# Patient Record
Sex: FEMALE | Race: BLACK OR AFRICAN AMERICAN | NOT HISPANIC OR LATINO | Employment: OTHER | URBAN - METROPOLITAN AREA
[De-identification: names, ages, dates, MRNs, and addresses within clinical notes are randomized per-mention and may not be internally consistent; named-entity substitution may affect disease eponyms.]

---

## 2018-03-11 ENCOUNTER — OFFICE VISIT (OUTPATIENT)
Dept: URGENT CARE | Facility: CLINIC | Age: 68
End: 2018-03-11
Payer: MEDICARE

## 2018-03-11 VITALS
RESPIRATION RATE: 19 BRPM | BODY MASS INDEX: 32.65 KG/M2 | HEART RATE: 71 BPM | SYSTOLIC BLOOD PRESSURE: 158 MMHG | HEIGHT: 65 IN | WEIGHT: 196 LBS | DIASTOLIC BLOOD PRESSURE: 82 MMHG | TEMPERATURE: 98 F | OXYGEN SATURATION: 97 %

## 2018-03-11 DIAGNOSIS — R05.9 COUGH: Primary | ICD-10-CM

## 2018-03-11 LAB
CTP QC/QA: YES
FLUAV AG NPH QL: NEGATIVE
FLUBV AG NPH QL: NEGATIVE

## 2018-03-11 PROCEDURE — 87804 INFLUENZA ASSAY W/OPTIC: CPT | Mod: 59,QW,S$GLB, | Performed by: PHYSICIAN ASSISTANT

## 2018-03-11 PROCEDURE — 99203 OFFICE O/P NEW LOW 30 MIN: CPT | Mod: S$GLB,,, | Performed by: PHYSICIAN ASSISTANT

## 2018-03-11 RX ORDER — DOXYCYCLINE 100 MG/1
100 CAPSULE ORAL 2 TIMES DAILY
Qty: 20 CAPSULE | Refills: 0 | Status: SHIPPED | OUTPATIENT
Start: 2018-03-11 | End: 2018-03-21

## 2018-03-11 RX ORDER — BENZONATATE 100 MG/1
200 CAPSULE ORAL EVERY 6 HOURS PRN
Qty: 30 CAPSULE | Refills: 1 | Status: SHIPPED | OUTPATIENT
Start: 2018-03-11 | End: 2022-03-10 | Stop reason: ALTCHOICE

## 2018-03-11 RX ORDER — SIMVASTATIN 10 MG/1
10 TABLET, FILM COATED ORAL NIGHTLY
COMMUNITY
End: 2022-03-23

## 2018-03-11 NOTE — PROGRESS NOTES
"Subjective:       Patient ID: Lucrecia Foster is a 68 y.o. female.    Vitals:  height is 5' 5" (1.651 m) and weight is 88.9 kg (196 lb). Her oral temperature is 97.5 °F (36.4 °C). Her blood pressure is 158/82 (abnormal) and her pulse is 71. Her respiration is 19 and oxygen saturation is 97%.     Chief Complaint: Cough    Patient with cough, body aches, chills, and fever x 3 days. No exposure to flu. Patient did get her flu shot.     The patient is a 68 y.o. female with a history of a cough of 3 days duration. The problem is described as severe. The cough is dry. It persists with sleep. Associated signs and symptoms include: runny nose, chills and malaise.    The pt has a history of asthma.  The pt has a history of AR  The pt has a history of eczema.  The pt does not have a  history of urticaria.     The patient has been treated with the following: no treatment to date . The following antibiotics have been presscribed no recent courses.     PMH:  - DM  - HTN  - RASTA  - Kidney Disease: stage 3  - Fibromyalgia  - Vit D def  - Stroke: 2015  - Sciatica     Social  - Retired : 43 years at [employer]  - Denies smoking    Per Dr. Blair I have seen and evaluated this patient. She does have very pronounced deep cough but lungs are clear. Flu neg and CXR without lobar infiltrate  She did have subjective fever and has stable VS Agree with PA disposition and assessment      Cough   This is a new problem. Episode onset: 3 days. The problem has been unchanged. The problem occurs every few minutes. Cough characteristics: sometimes productive at night. Associated symptoms include chills, a fever, myalgias, a sore throat and shortness of breath. Pertinent negatives include no chest pain, ear pain, eye redness, hemoptysis or wheezing. She has tried nothing for the symptoms. There is no history of pneumonia.     Review of Systems   Constitution: Positive for chills, fever and malaise/fatigue.   HENT: Positive for " congestion, hoarse voice and sore throat. Negative for ear pain.    Eyes: Negative for discharge and redness.   Cardiovascular: Negative for chest pain, dyspnea on exertion and leg swelling.   Respiratory: Positive for cough (Right sided chest pain with dry cough), shortness of breath, sleep disturbances due to breathing, snoring (RASTA, uses CPAP) and sputum production. Negative for hemoptysis and wheezing.    Endocrine:        DM: borderline  HTN  Vit D Def   Musculoskeletal: Positive for back pain (hx of sciatica) and myalgias.   Gastrointestinal: Negative for abdominal pain and nausea.   Neurological: Positive for dizziness (followed by neuro).       Objective:      Physical Exam   Constitutional: She is oriented to person, place, and time. She appears well-developed and well-nourished. She is cooperative.  Non-toxic appearance. She does not appear ill. No distress.   HENT:   Head: Normocephalic and atraumatic.   Right Ear: Hearing, tympanic membrane, external ear and ear canal normal. Tympanic membrane is not erythematous. No middle ear effusion.   Left Ear: Hearing, tympanic membrane, external ear and ear canal normal. Tympanic membrane is not erythematous.  No middle ear effusion.   Nose: Mucosal edema present. No rhinorrhea or nasal deformity. No epistaxis. Right sinus exhibits no maxillary sinus tenderness and no frontal sinus tenderness. Left sinus exhibits no maxillary sinus tenderness and no frontal sinus tenderness.   Mouth/Throat: Uvula is midline, oropharynx is clear and moist and mucous membranes are normal. No trismus in the jaw. Normal dentition. No uvula swelling. No oropharyngeal exudate, posterior oropharyngeal edema or posterior oropharyngeal erythema.   Eyes: Conjunctivae and lids are normal. No scleral icterus.   Sclera clear bilat   Neck: Trachea normal, full passive range of motion without pain and phonation normal. Neck supple.   Cardiovascular: Normal rate, regular rhythm, normal heart  sounds, intact distal pulses and normal pulses.    Pulmonary/Chest: Effort normal and breath sounds normal. No accessory muscle usage. No respiratory distress. She exhibits no tenderness and no retraction.   Abdominal: Soft. Normal appearance and bowel sounds are normal. She exhibits no distension. There is no tenderness.   Musculoskeletal: Normal range of motion. She exhibits no edema or deformity.   Neurological: She is alert and oriented to person, place, and time. She exhibits normal muscle tone. Coordination normal.   Skin: Skin is warm, dry and intact. She is not diaphoretic. No pallor.   Psychiatric: She has a normal mood and affect. Her speech is normal and behavior is normal. Judgment and thought content normal. Cognition and memory are normal.   Nursing note and vitals reviewed.        Results for orders placed or performed in visit on 03/11/18   POCT Influenza A/B   Result Value Ref Range    Rapid Influenza A Ag Negative Negative    Rapid Influenza B Ag Negative Negative     Acceptable Yes      Chest Xray Radiology report:  - FINDINGS: The mediastinal structures are midline.  The cardiac silhouette not enlarged.  There is no evidence of acute pulmonary disease, pleural disease, lymph node enlargement, or cardiac decompensation. There is degenerative change of the spine.    Assessment:       1. Cough        Plan:         Cough  -     POCT Influenza A/B  -     X-Ray Chest PA And Lateral; Future; Expected date: 03/11/2018  -     benzonatate (TESSALON PERLES) 100 MG capsule; Take 2 capsules (200 mg total) by mouth every 6 (six) hours as needed for Cough.  Dispense: 30 capsule; Refill: 1  -     doxycycline (VIBRAMYCIN) 100 MG Cap; Take 1 capsule (100 mg total) by mouth 2 (two) times daily.  Dispense: 20 capsule; Refill: 0      Patient Instructions       Cough, Chronic, Uncertain Cause (Adult)    Everyone has had a cough as part of the common cold, flu, or bronchitis. This kind of cough occurs  along with an achy feeling, low-grade fever, nasal and sinus congestion, and a scratchy or sore throat. This usually gets better in 2 to 3 weeks. A cough that lasts longer than 3 weeks may be due to other causes.  If your cough does not improve over the next 2 weeks, further testing may be needed. Follow up with your healthcare provider as advised. Cough suppressants may be recommended. Based on your exam today, the exact cause of your cough is not certain. Below are some common causes for persistent cough.  Smokers cough  Smokers cough doesnt go away. If you continue to smoke, it only gets worse. The cough is from irritation in the air passages. Talk to your healthcare provider about quitting. Medicines or nicotine-replacement products, like gum or the patch, may make quitting easier.  Postnasal drip  A cough that is worse at night may be due to postnasal drip. Excess mucus in the nose drains from the back of your nose to your throat. This triggers the cough reflex. Postnasal drip may be due to a sinus infection or allergy. Common allergens include dust, tobacco smoke (both inhaled and secondhand smoke), environmental pollutants, pollen, mold, pets, cleaning agents, room deodorizers, and chemical fumes. Over-the-counter antihistamines or decongestants may be helpful for allergies. A sinus infection may requires antibiotic treatment. See your healthcare provider if symptoms continue.  Medicines  Certain prescribed medicines can cause a chronic cough in some people:  · ACE inhibitors for high blood pressure. These include benazepril, captopril, enalapril, fosinopril, lisinopril, quinapril, ramipril, and others.  · Beta-blockers for high blood pressure and other conditions. These include propranolol, atenolol, metoprolol, nadolol, and others.  Let your healthcare provider know if you are taking any of these.  Asthma  Cough may be the only sign of mild asthma. You may have tests to find out if asthma is causing your  cough. You may also take asthma medicine on a trial basis.  Acid reflux (heartburn, GERD)  The esophagus is the tube that carries food from the mouth to the stomach. A valve at its lower end prevents stomach acids from flowing upward. If this valve does not work properly, acid from the stomach enters the esophagus. This may cause a burning pain in the upper abdomen or lower chest, belching, or cough. Symptoms are often worse when lying flat. Avoid eating or drinking before bedtime. Try using extra pillows to raise your upper body, or place 4-inch blocks under the head of your bed. You may try an over-the-counter antacid or an acid-blocking medicine such as famotidine, cimetidine, ranitidine, esomeprazole, lansoprazole, or omeprazole. Stronger medicines for this condition can be prescribed by your healthcare provider.  Follow-up care  Follow up with your healthcare provider, or as advised, if your cough does not improve. Further testing may be needed.  Note: If an X-ray was taken, a specialist will review it. You will be notified of any new findings that may affect your care.  When to seek medical advice  Call your healthcare provider right away if any of these occur:  · Mild wheezing or difficulty breathing  · Fever of 100.4ºF (38ºC) or higher, or as directed by your healthcare provider  · Unexpected weight loss  · Coughing up large amounts of colored sputum  · Night sweats (sheets and pajamas get soaking wet)  Call 911, or get immediate medical care  Contact emergency services right away if any of these occur:  · Coughing up blood  · Moderate to severe trouble breathing or wheezing  Date Last Reviewed: 9/13/2015 © 2000-2017 [employer]. [address] [place] [address]. All rights reserved. This information is not intended as a substitute for professional medical care. Always follow your healthcare professional's instructions.      Please return here or go to the Emergency Department for any  concerns or worsening of condition.  If you were prescribed antibiotics, please take them to completion.  If you were prescribed a narcotic medication, do not drive or operate heavy equipment or machinery while taking these medications.  Please follow up with your primary care doctor or specialist as needed.    If you  smoke, please stop smoking.    Results for orders placed or performed in visit on 03/11/18   POCT Influenza A/B   Result Value Ref Range    Rapid Influenza A Ag Negative Negative    Rapid Influenza B Ag Negative Negative     Acceptable Yes

## 2018-03-11 NOTE — PATIENT INSTRUCTIONS
Cough, Chronic, Uncertain Cause (Adult)    Everyone has had a cough as part of the common cold, flu, or bronchitis. This kind of cough occurs along with an achy feeling, low-grade fever, nasal and sinus congestion, and a scratchy or sore throat. This usually gets better in 2 to 3 weeks. A cough that lasts longer than 3 weeks may be due to other causes.  If your cough does not improve over the next 2 weeks, further testing may be needed. Follow up with your healthcare provider as advised. Cough suppressants may be recommended. Based on your exam today, the exact cause of your cough is not certain. Below are some common causes for persistent cough.  Smokers cough  Smokers cough doesnt go away. If you continue to smoke, it only gets worse. The cough is from irritation in the air passages. Talk to your healthcare provider about quitting. Medicines or nicotine-replacement products, like gum or the patch, may make quitting easier.  Postnasal drip  A cough that is worse at night may be due to postnasal drip. Excess mucus in the nose drains from the back of your nose to your throat. This triggers the cough reflex. Postnasal drip may be due to a sinus infection or allergy. Common allergens include dust, tobacco smoke (both inhaled and secondhand smoke), environmental pollutants, pollen, mold, pets, cleaning agents, room deodorizers, and chemical fumes. Over-the-counter antihistamines or decongestants may be helpful for allergies. A sinus infection may requires antibiotic treatment. See your healthcare provider if symptoms continue.  Medicines  Certain prescribed medicines can cause a chronic cough in some people:  · ACE inhibitors for high blood pressure. These include benazepril, captopril, enalapril, fosinopril, lisinopril, quinapril, ramipril, and others.  · Beta-blockers for high blood pressure and other conditions. These include propranolol, atenolol, metoprolol, nadolol, and others.  Let your healthcare provider  know if you are taking any of these.  Asthma  Cough may be the only sign of mild asthma. You may have tests to find out if asthma is causing your cough. You may also take asthma medicine on a trial basis.  Acid reflux (heartburn, GERD)  The esophagus is the tube that carries food from the mouth to the stomach. A valve at its lower end prevents stomach acids from flowing upward. If this valve does not work properly, acid from the stomach enters the esophagus. This may cause a burning pain in the upper abdomen or lower chest, belching, or cough. Symptoms are often worse when lying flat. Avoid eating or drinking before bedtime. Try using extra pillows to raise your upper body, or place 4-inch blocks under the head of your bed. You may try an over-the-counter antacid or an acid-blocking medicine such as famotidine, cimetidine, ranitidine, esomeprazole, lansoprazole, or omeprazole. Stronger medicines for this condition can be prescribed by your healthcare provider.  Follow-up care  Follow up with your healthcare provider, or as advised, if your cough does not improve. Further testing may be needed.  Note: If an X-ray was taken, a specialist will review it. You will be notified of any new findings that may affect your care.  When to seek medical advice  Call your healthcare provider right away if any of these occur:  · Mild wheezing or difficulty breathing  · Fever of 100.4ºF (38ºC) or higher, or as directed by your healthcare provider  · Unexpected weight loss  · Coughing up large amounts of colored sputum  · Night sweats (sheets and pajamas get soaking wet)  Call 911, or get immediate medical care  Contact emergency services right away if any of these occur:  · Coughing up blood  · Moderate to severe trouble breathing or wheezing  Date Last Reviewed: 9/13/2015  © 9085-7359 [employer]. [address] [place] [address], [place] [address]. All rights reserved. This information is not intended as a substitute for  professional medical care. Always follow your healthcare professional's instructions.      Please return here or go to the Emergency Department for any concerns or worsening of condition.  If you were prescribed antibiotics, please take them to completion.  If you were prescribed a narcotic medication, do not drive or operate heavy equipment or machinery while taking these medications.  Please follow up with your primary care doctor or specialist as needed.    If you  smoke, please stop smoking.    Results for orders placed or performed in visit on 03/11/18   POCT Influenza A/B   Result Value Ref Range    Rapid Influenza A Ag Negative Negative    Rapid Influenza B Ag Negative Negative     Acceptable Yes

## 2018-12-14 ENCOUNTER — OFFICE VISIT (OUTPATIENT)
Dept: INTERNAL MEDICINE | Facility: CLINIC | Age: 68
End: 2018-12-14
Payer: MEDICARE

## 2018-12-14 VITALS
OXYGEN SATURATION: 97 % | TEMPERATURE: 98 F | HEART RATE: 72 BPM | BODY MASS INDEX: 33.65 KG/M2 | SYSTOLIC BLOOD PRESSURE: 130 MMHG | HEIGHT: 65 IN | WEIGHT: 201.94 LBS | DIASTOLIC BLOOD PRESSURE: 78 MMHG

## 2018-12-14 DIAGNOSIS — R21 RASH AND NONSPECIFIC SKIN ERUPTION: ICD-10-CM

## 2018-12-14 DIAGNOSIS — L71.0 PERIORAL DERMATITIS: Primary | ICD-10-CM

## 2018-12-14 PROCEDURE — 99203 OFFICE O/P NEW LOW 30 MIN: CPT | Mod: S$GLB,,, | Performed by: NURSE PRACTITIONER

## 2018-12-14 PROCEDURE — 1101F PT FALLS ASSESS-DOCD LE1/YR: CPT | Mod: CPTII,S$GLB,, | Performed by: NURSE PRACTITIONER

## 2018-12-14 PROCEDURE — 99999 PR PBB SHADOW E&M-EST. PATIENT-LVL IV: CPT | Mod: PBBFAC,,, | Performed by: NURSE PRACTITIONER

## 2018-12-14 RX ORDER — KETOCONAZOLE 20 MG/G
CREAM TOPICAL 2 TIMES DAILY
Qty: 60 G | Refills: 0 | Status: SHIPPED | OUTPATIENT
Start: 2018-12-14 | End: 2022-03-10 | Stop reason: ALTCHOICE

## 2018-12-14 RX ORDER — DOXYCYCLINE HYCLATE 100 MG
100 TABLET ORAL EVERY 12 HOURS
Qty: 20 TABLET | Refills: 0 | Status: SHIPPED | OUTPATIENT
Start: 2018-12-14 | End: 2018-12-24

## 2018-12-14 NOTE — PROGRESS NOTES
"Subjective:       Patient ID: Lucrecia Foster is a 68 y.o. female.    Chief Complaint: Rash (for about 3 weeks around th bottom of the mouth dry, itchy, )    HPI:  67 yo female that presents to clinic with complaints of rash to mouth.    States that rash has been there for about 3 weeks.  Denies any previous history with rash.  States that it is very "dry, itchy, scaly and will occasionally burn."  States that she saw a dermatologist outside of [facility] that "perscribed some cream that cost $1100."  States that she has tried putting over the counter steroid cream on it but states no relief.  States that she uses unscented dial soap to wash face.  States that rash is confined to lower part of mouth/chin.  Denies any bleeding or drainage form rash.    Denies any fever, SOB, chest pain, n/v or dizziness.  States that she has been on steroid therapy for eyes and ears recently following surgeries.  States that she is still on steroid drops for her eyes.    Review of Systems   Constitutional: Negative for activity change, appetite change, fatigue and fever.   Respiratory: Negative for apnea, cough, shortness of breath and wheezing.    Cardiovascular: Negative for chest pain, palpitations and leg swelling.   Gastrointestinal: Negative for abdominal distention, abdominal pain, constipation, diarrhea, nausea and vomiting.   Musculoskeletal: Negative for arthralgias, back pain, myalgias, neck pain and neck stiffness.   Skin: Positive for color change and rash.   Neurological: Negative for dizziness, light-headedness, numbness and headaches.   Psychiatric/Behavioral: Negative for behavioral problems.       Objective:      Physical Exam   Constitutional: She is oriented to person, place, and time. She appears well-developed and well-nourished. No distress.   Neck: Normal range of motion. Neck supple. No thyromegaly present.   Cardiovascular: Normal rate, regular rhythm, normal heart sounds and intact distal pulses.   No murmur " heard.  Pulmonary/Chest: Effort normal and breath sounds normal. No stridor. No respiratory distress. She has no wheezes. She has no rales.   Lymphadenopathy:     She has no cervical adenopathy.   Neurological: She is alert and oriented to person, place, and time. No cranial nerve deficit or sensory deficit.   Skin: Skin is warm, dry and intact. Rash noted. Rash is macular.        Macular, pruritic rash with scaling noted to lower mouth and chin.  Skin appears very dry.   Psychiatric: Her behavior is normal.       Assessment:       1. Perioral dermatitis    2. Rash and nonspecific skin eruption        Plan:     -Vitals are stable in clinic.  -Perioral dermatitis vs possible fungal rash.  -Will treat with oral course of doxycycline 100mg po bid x 10 days and topical nizoral cream bid x 10 days.  -Encouraged to avoid any topical steroids to area.  -Can continue to wash face with unscented dial soap.  -Will refer to dermatology for further evaluation and management.  -Encouraged to increase water intake to maintain skin hydration.  -Can also try over the counter unscented moisturizers like Aveeno, Lubriderm or Cetaphil.

## 2019-03-25 ENCOUNTER — OFFICE VISIT (OUTPATIENT)
Dept: URGENT CARE | Facility: CLINIC | Age: 69
End: 2019-03-25
Payer: MEDICARE

## 2019-03-25 ENCOUNTER — HOSPITAL ENCOUNTER (OUTPATIENT)
Dept: RADIOLOGY | Facility: OTHER | Age: 69
Discharge: HOME | End: 2019-03-25
Attending: FAMILY MEDICINE
Payer: MEDICARE

## 2019-03-25 VITALS
DIASTOLIC BLOOD PRESSURE: 85 MMHG | SYSTOLIC BLOOD PRESSURE: 137 MMHG | RESPIRATION RATE: 18 BRPM | BODY MASS INDEX: 33.32 KG/M2 | HEIGHT: 65 IN | OXYGEN SATURATION: 97 % | TEMPERATURE: 99 F | WEIGHT: 200 LBS | HEART RATE: 77 BPM

## 2019-03-25 DIAGNOSIS — R05.9 COUGH: Primary | ICD-10-CM

## 2019-03-25 DIAGNOSIS — R05.9 COUGH: ICD-10-CM

## 2019-03-25 DIAGNOSIS — R50.9 FEVER, UNSPECIFIED FEVER CAUSE: ICD-10-CM

## 2019-03-25 DIAGNOSIS — R53.83 FATIGUE, UNSPECIFIED TYPE: ICD-10-CM

## 2019-03-25 DIAGNOSIS — R30.0 DYSURIA: ICD-10-CM

## 2019-03-25 LAB
BILIRUB UR QL STRIP: NEGATIVE
CTP QC/QA: YES
FLUAV AG NPH QL: NEGATIVE
FLUBV AG NPH QL: NEGATIVE
GLUCOSE UR QL STRIP: NEGATIVE
KETONES UR QL STRIP: NEGATIVE
LEUKOCYTE ESTERASE UR QL STRIP: POSITIVE
PH, POC UA: 5 (ref 5–8)
POC BLOOD, URINE: NEGATIVE
POC NITRATES, URINE: NEGATIVE
PROT UR QL STRIP: NEGATIVE
SP GR UR STRIP: 1.02 (ref 1–1.03)
UROBILINOGEN UR STRIP-ACNC: NORMAL (ref 0.1–1.1)

## 2019-03-25 PROCEDURE — 99000 PR SPECIMEN HANDLING,DR OFF->LAB: ICD-10-PCS | Mod: S$GLB,,, | Performed by: FAMILY MEDICINE

## 2019-03-25 PROCEDURE — 71046 X-RAY EXAM CHEST 2 VIEWS: CPT | Mod: 26,,, | Performed by: RADIOLOGY

## 2019-03-25 PROCEDURE — 87804 POCT INFLUENZA A/B: ICD-10-PCS | Mod: QW,S$GLB,, | Performed by: FAMILY MEDICINE

## 2019-03-25 PROCEDURE — 71046 X-RAY EXAM CHEST 2 VIEWS: CPT | Mod: TC,FY

## 2019-03-25 PROCEDURE — 87086 URINE CULTURE/COLONY COUNT: CPT

## 2019-03-25 PROCEDURE — 81003 POCT URINALYSIS, DIPSTICK, MANUAL, W/O SCOPE: ICD-10-PCS | Mod: QW,S$GLB,, | Performed by: FAMILY MEDICINE

## 2019-03-25 PROCEDURE — 87088 URINE BACTERIA CULTURE: CPT

## 2019-03-25 PROCEDURE — 87804 INFLUENZA ASSAY W/OPTIC: CPT | Mod: QW,S$GLB,, | Performed by: FAMILY MEDICINE

## 2019-03-25 PROCEDURE — 99000 SPECIMEN HANDLING OFFICE-LAB: CPT | Mod: S$GLB,,, | Performed by: FAMILY MEDICINE

## 2019-03-25 PROCEDURE — 1101F PR PT FALLS ASSESS DOC 0-1 FALLS W/OUT INJ PAST YR: ICD-10-PCS | Mod: CPTII,S$GLB,, | Performed by: FAMILY MEDICINE

## 2019-03-25 PROCEDURE — 87077 CULTURE AEROBIC IDENTIFY: CPT

## 2019-03-25 PROCEDURE — 81003 URINALYSIS AUTO W/O SCOPE: CPT | Mod: QW,S$GLB,, | Performed by: FAMILY MEDICINE

## 2019-03-25 PROCEDURE — 71046 XR CHEST PA AND LATERAL: ICD-10-PCS | Mod: 26,,, | Performed by: RADIOLOGY

## 2019-03-25 PROCEDURE — 99214 OFFICE O/P EST MOD 30 MIN: CPT | Mod: 25,S$GLB,, | Performed by: FAMILY MEDICINE

## 2019-03-25 PROCEDURE — 1101F PT FALLS ASSESS-DOCD LE1/YR: CPT | Mod: CPTII,S$GLB,, | Performed by: FAMILY MEDICINE

## 2019-03-25 PROCEDURE — 87186 SC STD MICRODIL/AGAR DIL: CPT

## 2019-03-25 PROCEDURE — 99214 PR OFFICE/OUTPT VISIT, EST, LEVL IV, 30-39 MIN: ICD-10-PCS | Mod: 25,S$GLB,, | Performed by: FAMILY MEDICINE

## 2019-03-25 RX ORDER — HYDROCHLOROTHIAZIDE 25 MG/1
25 TABLET ORAL DAILY
COMMUNITY
End: 2022-03-23

## 2019-03-25 RX ORDER — LUBIPROSTONE 24 UG/1
24 CAPSULE ORAL 2 TIMES DAILY WITH MEALS
COMMUNITY
End: 2022-03-23

## 2019-03-25 RX ORDER — OMEPRAZOLE 20 MG/1
20 CAPSULE, DELAYED RELEASE ORAL DAILY
COMMUNITY
End: 2022-03-23

## 2019-03-25 RX ORDER — ATORVASTATIN CALCIUM 40 MG/1
40 TABLET, FILM COATED ORAL DAILY
COMMUNITY

## 2019-03-25 RX ORDER — LISINOPRIL 20 MG/1
20 TABLET ORAL DAILY
COMMUNITY
End: 2022-03-23

## 2019-03-25 NOTE — PROGRESS NOTES
"Subjective:       Patient ID: Lucrecia Foster is a 69 y.o. female.    Vitals:  height is 5' 5" (1.651 m) and weight is 90.7 kg (200 lb). Her temperature is 99.3 °F (37.4 °C). Her blood pressure is 137/85 and her pulse is 77. Her respiration is 18 and oxygen saturation is 97%.     Chief Complaint: Sinus Problem    Patient presents with c/o sore throat, productive cough, short of breath that has been going on for 3 weeks, symptoms worsened on Friday along with no energy. Has been in the bed for two days.  Patient also has had some frequency and burning during urination - that started on Friday but is improving. No fever that she knows of. Had flu shot. Saw her pcp on thursday ([facility]) and did blood work - she was seen by them at onset of sx and had neg flu test this was a f/u visit. She has not gotten the results - she has not tried to contact them. She saw some "glaring" and photophobia in the left eye on Friday when sx started but that has since resolved    Cough   This is a new problem. The current episode started in the past 7 days. The problem has been unchanged. The problem occurs constantly. The cough is productive of sputum. Associated symptoms include a sore throat and shortness of breath. Pertinent negatives include no chills, ear pain, eye redness, fever, headaches, hemoptysis, myalgias, rash or wheezing. Nothing aggravates the symptoms. She has tried nothing for the symptoms. The treatment provided no relief.       Constitution: Positive for activity change, appetite change and fatigue. Negative for chills, sweating and fever.   HENT: Positive for congestion, sore throat and voice change. Negative for ear pain, sinus pain and sinus pressure.    Neck: Negative for painful lymph nodes.   Eyes: Negative for eye redness.   Respiratory: Positive for cough, sputum production and shortness of breath. Negative for chest tightness, bloody sputum, COPD, stridor, wheezing and asthma.    Gastrointestinal: Negative for " nausea and vomiting.   Genitourinary: Positive for dysuria, frequency and urgency.   Musculoskeletal: Negative for muscle ache.   Skin: Negative for rash.   Allergic/Immunologic: Negative for seasonal allergies and asthma.   Neurological: Negative for headaches.   Hematologic/Lymphatic: Negative for swollen lymph nodes.       Objective:      Physical Exam   Constitutional: She is oriented to person, place, and time. She appears well-developed and well-nourished. She is cooperative.  Non-toxic appearance. She does not appear ill. No distress.   HENT:   Head: Normocephalic and atraumatic.   Right Ear: Hearing, tympanic membrane, external ear and ear canal normal.   Left Ear: Hearing, tympanic membrane, external ear and ear canal normal.   Nose: Mucosal edema present. No rhinorrhea or nasal deformity. No epistaxis. Right sinus exhibits no maxillary sinus tenderness and no frontal sinus tenderness. Left sinus exhibits no maxillary sinus tenderness and no frontal sinus tenderness.   Mouth/Throat: Uvula is midline, oropharynx is clear and moist and mucous membranes are normal. No trismus in the jaw. Normal dentition. No uvula swelling. No oropharyngeal exudate or posterior oropharyngeal erythema.   Eyes: Conjunctivae and lids are normal. No scleral icterus.   Sclera clear bilat   Neck: Trachea normal, normal range of motion, full passive range of motion without pain and phonation normal. Neck supple.   Cardiovascular: Normal rate, regular rhythm, normal heart sounds, intact distal pulses and normal pulses.   Pulmonary/Chest: Effort normal and breath sounds normal. No accessory muscle usage. No tachypnea. No respiratory distress. She has no decreased breath sounds. She has no wheezes. She has no rhonchi. She has no rales.   NAD - able to speak in clear complete sentences   Abdominal: Soft. Normal appearance and bowel sounds are normal. She exhibits no distension and no mass. There is no tenderness. There is no rigidity, no  rebound, no guarding, no CVA tenderness, no tenderness at McBurney's point and negative Sullivan's sign.   Musculoskeletal: Normal range of motion. She exhibits no edema or deformity.   Neurological: She is alert and oriented to person, place, and time. She exhibits normal muscle tone. Coordination and gait normal.   Skin: Skin is warm, dry and intact. She is not diaphoretic. No pallor.   Psychiatric: She has a normal mood and affect. Her speech is normal and behavior is normal. Judgment and thought content normal. Cognition and memory are normal.   Nursing note and vitals reviewed.      Results for orders placed or performed in visit on 03/25/19   POCT Urinalysis, Dipstick, Manual, W/O Scope   Result Value Ref Range    POC Blood, Urine Negative Negative    POC Bilirubin, Urine Negative Negative    POC Urobilinogen, Urine normal 0.1 - 1.1    POC Ketones, Urine Negative Negative    POC Protein, Urine Negative Negative    POC Nitrates, Urine Negative Negative    POC Glucose, Urine Negative Negative    pH, UA 5 5 - 8    POC Specific Gravity, Urine 1.020 1.003 - 1.029    POC Leukocytes, Urine Positive (A) Negative   POCT Influenza A/B   Result Value Ref Range    Rapid Influenza A Ag Negative Negative    Rapid Influenza B Ag Negative Negative     Acceptable Yes      X-ray Chest Pa And Lateral    Result Date: 3/25/2019  EXAMINATION: XR CHEST PA AND LATERAL CLINICAL HISTORY: cough, sob; Cough TECHNIQUE: PA and lateral views of the chest were performed. COMPARISON: 03/11/2018 FINDINGS: Linear left lower lobe scarring stable.The lungs are clear, with normal appearance of pulmonary vasculature and no pleural effusion or pneumothorax. The cardiac silhouette is normal in size. The hilar and mediastinal contours are unremarkable. The osseous and soft tissue structures are normal.     Normal exam. Electronically signed by: Nandini Koroma MD Date:    03/25/2019 Time:    16:07      Assessment:       1. Cough    2.  Fever, unspecified fever cause    3. Dysuria    4. Fatigue, unspecified type        Plan:         Cough  -     X-Ray Chest PA And Lateral; Future; Expected date: 03/25/2019    Fever, unspecified fever cause  Comments:  highest 99  Orders:  -     POCT Urinalysis, Dipstick, Manual, W/O Scope  -     POCT Influenza A/B    Dysuria  Comments:  improving  Orders:  -     Urine culture    Fatigue, unspecified type    [facility] xray broken, sending to [facility] for xray  will r/o pneumonia, will culture urine as sx improving but had leukocytes on UA. Advised to call pcp upon leaving the clinic for her blood work results and further f/u if needed.       Reviewed xray results with pt, waiting for call back from her pcp's office    Patient Instructions     PLEASE READ YOUR DISCHARGE INSTRUCTIONS ENTIRELY AS IT CONTAINS IMPORTANT INFORMATION.    I WILL CALL YOU WITH THE RESULTS OF YOUR XRAY    PLEASE CALL YOUR PCP'S OFFICE UPON LEAVING THE CLINIC FOR THE RESULTS OF YOUR BLOOD WORK AND TO SEE IF THEY WOULD LIKE TO FOLLOW UP WITH YOU IN THE OFFICE    A culture of your urine was sent. You will be contacted once it results in about 3 days and appropriate action will be taken.     Please return or see your primary care doctor if you develop new or worsening symptoms.     You must understand that you have received an Urgent Care treatment only and that you may be released before all of your medical problems are known or treated.    Dysuria     Painful urination (dysuria) is often caused by a problem in the urinary tract.   Dysuria is pain felt during urination. It is often described as a burning. Learn more about this problem and how it can be treated.  What causes dysuria?  Possible causes include:  · Infection with a bacteria or virus such as a urinary tract infection (UTI or a sexually transmitted infection (STI)  · Sensitivity or allergy to chemicals such as those found in lotions and other products  · Prostate or bladder  "problems  · Radiation therapy to the pelvic area  How is dysuria diagnosed?  Your healthcare provider will examine you. He or she will ask about your symptoms and health. After talking with you and doing a physical exam, your healthcare provider may know what is causing your dysuria. He or she will usually request  a sample of your urine. Tests of your urine, or a "urinalysis," are done. A urinalysis may include:  · Looking at the urine sample (visual exam)  · Checking for substances (chemical exam)  · Looking at a small amount under a microscope (microscopic exam)  Some parts of the urinalysis may be done in the provider's office and some in a lab. And, the urine sample may be checked for bacteria and yeast (urine culture). Your healthcare provider will tell you more about these tests if they are needed.  How is dysuria treated?  Treatment depends on the cause. If you have a bacterial infection, you may need antibiotics. You may be given medicines to make it easier for you to urinate and help relieve pain. Your healthcare provider can tell you more about your treatment options. Untreated, symptoms may get worse.  When to call your healthcare provider  Call the healthcare provider right away if you have any of the following:  · Fever of 100.4°F (38°C) or higher   · No improvement after three days of treatment  · Trouble urinating because of pain  · New or increased discharge from the vagina or penis  · Rash or joint pain  · Increased back or abdominal pain  · Enlarged painful lymph nodes (lumps) in the groin   Date Last Reviewed: 1/1/2017  © 5795-4472 [employer]. [address] [place] [address], [place] [address]. All rights reserved. This information is not intended as a substitute for professional medical care. Always follow your healthcare professional's instructions.        Managing Fatigue     Family members can help with meals and chores around the house.   Fatigue is common. It can be caused by worry, " lack of sleep, or poor appetite. Fatigue can also be a sign of anemia, a shortage of red blood cells. You might need medical treatment for anemia. The tips below can help you feel better.  Conserving energy  · Keep track of the times of day when you are most tired and plan around them. For instance, if you are more tired in the afternoon, try to get tasks done in the morning.  · Decide which tasks are most important. Do those first.  · Pass tasks along to others when you need to. Ask for help.  · Accept help when its offered. Tell people what they can do to help. For instance, you may need someone to fix a meal, fold clothes, or put gas in your car.  · Plan rest times. You may want to take a nap each day. Just sitting quietly for a few minutes can make you feel more rested.  What you can do to feel better  · Relax before you try to sleep. Take a bath or read for a while.  · Form a sleep pattern. Go to bed at the same time each night and get up at the same time each morning.  · Eat well. Choose foods from all of the food groups each day.  · Exercise. Take a brisk walk to help increase your energy.  · Avoid caffeine and alcohol. Drink plenty of water or fruit juices instead.  Treating anemia  If you begin to feel more tired than normal, tell your doctor. Fatigue could be a sign of anemia. This problem is fairly common in cancer patients, especially during chemotherapy and radiation treatments. If your red blood cell count is too low, you may get a blood transfusion. In some cases, you may need medicine to increase the number of red blood cells your body makes.  When to call your healthcare provider  Call your healthcare provider if you have:  · Shortness of breath or chest pain  · A dizzy feeling when you get up from lying or sitting down  · Paler skin than normal  · Extreme tiredness that is not helped by sleep   Date Last Reviewed: 1/3/2016  © 7765-0745 [employer]. [address] [place] [address], [place]  [address]. All rights reserved. This information is not intended as a substitute for professional medical care. Always follow your healthcare professional's instructions.

## 2019-03-25 NOTE — PATIENT INSTRUCTIONS
"PLEASE READ YOUR DISCHARGE INSTRUCTIONS ENTIRELY AS IT CONTAINS IMPORTANT INFORMATION.    I WILL CALL YOU WITH THE RESULTS OF YOUR XRAY    PLEASE CALL YOUR PCP'S OFFICE UPON LEAVING THE CLINIC FOR THE RESULTS OF YOUR BLOOD WORK AND TO SEE IF THEY WOULD LIKE TO FOLLOW UP WITH YOU IN THE OFFICE    A culture of your urine was sent. You will be contacted once it results in about 3 days and appropriate action will be taken.     Please return or see your primary care doctor if you develop new or worsening symptoms.     You must understand that you have received an Urgent Care treatment only and that you may be released before all of your medical problems are known or treated.    Dysuria     Painful urination (dysuria) is often caused by a problem in the urinary tract.   Dysuria is pain felt during urination. It is often described as a burning. Learn more about this problem and how it can be treated.  What causes dysuria?  Possible causes include:  · Infection with a bacteria or virus such as a urinary tract infection (UTI or a sexually transmitted infection (STI)  · Sensitivity or allergy to chemicals such as those found in lotions and other products  · Prostate or bladder problems  · Radiation therapy to the pelvic area  How is dysuria diagnosed?  Your healthcare provider will examine you. He or she will ask about your symptoms and health. After talking with you and doing a physical exam, your healthcare provider may know what is causing your dysuria. He or she will usually request  a sample of your urine. Tests of your urine, or a "urinalysis," are done. A urinalysis may include:  · Looking at the urine sample (visual exam)  · Checking for substances (chemical exam)  · Looking at a small amount under a microscope (microscopic exam)  Some parts of the urinalysis may be done in the provider's office and some in a lab. And, the urine sample may be checked for bacteria and yeast (urine culture). Your healthcare provider " will tell you more about these tests if they are needed.  How is dysuria treated?  Treatment depends on the cause. If you have a bacterial infection, you may need antibiotics. You may be given medicines to make it easier for you to urinate and help relieve pain. Your healthcare provider can tell you more about your treatment options. Untreated, symptoms may get worse.  When to call your healthcare provider  Call the healthcare provider right away if you have any of the following:  · Fever of 100.4°F (38°C) or higher   · No improvement after three days of treatment  · Trouble urinating because of pain  · New or increased discharge from the vagina or penis  · Rash or joint pain  · Increased back or abdominal pain  · Enlarged painful lymph nodes (lumps) in the groin   Date Last Reviewed: 1/1/2017 © 2000-2017 [employer]. [address] [place] [address], [place] [address]. All rights reserved. This information is not intended as a substitute for professional medical care. Always follow your healthcare professional's instructions.        Managing Fatigue     Family members can help with meals and chores around the house.   Fatigue is common. It can be caused by worry, lack of sleep, or poor appetite. Fatigue can also be a sign of anemia, a shortage of red blood cells. You might need medical treatment for anemia. The tips below can help you feel better.  Conserving energy  · Keep track of the times of day when you are most tired and plan around them. For instance, if you are more tired in the afternoon, try to get tasks done in the morning.  · Decide which tasks are most important. Do those first.  · Pass tasks along to others when you need to. Ask for help.  · Accept help when its offered. Tell people what they can do to help. For instance, you may need someone to fix a meal, fold clothes, or put gas in your car.  · Plan rest times. You may want to take a nap each day. Just sitting quietly for a few minutes can  make you feel more rested.  What you can do to feel better  · Relax before you try to sleep. Take a bath or read for a while.  · Form a sleep pattern. Go to bed at the same time each night and get up at the same time each morning.  · Eat well. Choose foods from all of the food groups each day.  · Exercise. Take a brisk walk to help increase your energy.  · Avoid caffeine and alcohol. Drink plenty of water or fruit juices instead.  Treating anemia  If you begin to feel more tired than normal, tell your doctor. Fatigue could be a sign of anemia. This problem is fairly common in cancer patients, especially during chemotherapy and radiation treatments. If your red blood cell count is too low, you may get a blood transfusion. In some cases, you may need medicine to increase the number of red blood cells your body makes.  When to call your healthcare provider  Call your healthcare provider if you have:  · Shortness of breath or chest pain  · A dizzy feeling when you get up from lying or sitting down  · Paler skin than normal  · Extreme tiredness that is not helped by sleep   Date Last Reviewed: 1/3/2016  © 0193-2785 [employer]. [address] [place] [address], [place] [address]. All rights reserved. This information is not intended as a substitute for professional medical care. Always follow your healthcare professional's instructions.

## 2019-03-28 ENCOUNTER — TELEPHONE (OUTPATIENT)
Dept: URGENT CARE | Facility: CLINIC | Age: 69
End: 2019-03-28

## 2019-03-28 DIAGNOSIS — N30.00 ACUTE CYSTITIS WITHOUT HEMATURIA: Primary | ICD-10-CM

## 2019-03-28 LAB — BACTERIA UR CULT: NORMAL

## 2019-03-28 RX ORDER — NITROFURANTOIN 25; 75 MG/1; MG/1
100 CAPSULE ORAL 2 TIMES DAILY
Qty: 10 CAPSULE | Refills: 0 | Status: SHIPPED | OUTPATIENT
Start: 2019-03-28 | End: 2019-04-02

## 2019-03-28 NOTE — TELEPHONE ENCOUNTER
Patient states still having a little bit of dysuria.  But better than when she was seen.  Fatigue better.  Will call and Macrobid per culture.

## 2019-04-15 ENCOUNTER — OFFICE VISIT (OUTPATIENT)
Dept: URGENT CARE | Facility: CLINIC | Age: 69
End: 2019-04-15
Payer: MEDICARE

## 2019-04-15 VITALS
OXYGEN SATURATION: 99 % | RESPIRATION RATE: 18 BRPM | DIASTOLIC BLOOD PRESSURE: 65 MMHG | WEIGHT: 200 LBS | BODY MASS INDEX: 33.32 KG/M2 | SYSTOLIC BLOOD PRESSURE: 115 MMHG | TEMPERATURE: 99 F | HEIGHT: 65 IN | HEART RATE: 79 BPM

## 2019-04-15 DIAGNOSIS — L50.9 URTICARIA: Primary | ICD-10-CM

## 2019-04-15 PROCEDURE — 99213 OFFICE O/P EST LOW 20 MIN: CPT | Mod: S$GLB,,, | Performed by: FAMILY MEDICINE

## 2019-04-15 PROCEDURE — 99213 PR OFFICE/OUTPT VISIT, EST, LEVL III, 20-29 MIN: ICD-10-PCS | Mod: S$GLB,,, | Performed by: FAMILY MEDICINE

## 2019-04-15 PROCEDURE — 1101F PR PT FALLS ASSESS DOC 0-1 FALLS W/OUT INJ PAST YR: ICD-10-PCS | Mod: CPTII,S$GLB,, | Performed by: FAMILY MEDICINE

## 2019-04-15 PROCEDURE — 1101F PT FALLS ASSESS-DOCD LE1/YR: CPT | Mod: CPTII,S$GLB,, | Performed by: FAMILY MEDICINE

## 2019-04-15 RX ORDER — TRIAMCINOLONE ACETONIDE 1 MG/G
CREAM TOPICAL 3 TIMES DAILY
Qty: 15 G | Refills: 0 | Status: SHIPPED | OUTPATIENT
Start: 2019-04-15 | End: 2019-04-22

## 2019-04-15 NOTE — PATIENT INSTRUCTIONS
PLEASE READ YOUR DISCHARGE INSTRUCTIONS ENTIRELY AS IT CONTAINS IMPORTANT INFORMATION.      Please drink plenty of fluids.  Please get plenty of rest.  Please return here or go to the Emergency Department for any concerns or worsening of condition (worsening rash, difficulty swallowing, shortness of breath, passing out).      If you have a localized reaction it is ok to apply topical creams  as directed to the affected area.    Please take an over the counter antihistamine medication (allegra/Claritin/Zyrtec) of your choice as directed.  Benadryl at night - may make you drowsy do not drive after    Please follow up with your primary care doctor or specialist as needed.    If you  smoke, please stop smoking.    Please arrange follow up with your primary medical clinic as soon as possible. You must understand that you've received an Urgent Care treatment only and that you may be released before all of your medical problems are known or treated. You, the patient, will arrange for follow up as instructed. If your symptoms worsen or fail to improve you should go to the Emergency Room.    Hives (Adult)  Hives are pink or red bumps on the skin. These bumps are also known as wheals. The bumps can itch, burn, or sting. Hives can occur anywhere on the body. They vary in size and shape and can form in clusters. Individual hives can appear and go away quickly. New hives may develop as old ones fade. Hives are common and usually harmless. Occasionally hives are a sign of a serious allergy.  Hives are often caused by an allergic reaction. It may be an allergic reaction to foods such as fruit, shellfish, chocolate, nuts, or tomatoes. It may be a reaction to pollens, animal fur, or mold spores. Medicines, chemicals, and insect bites can also cause hives. And hives can be caused by hot sun or cold air. The cause of hives can be difficult to find.  You may be given medicines to relieve swelling and itching. Follow all instructions  when using these medicines. The hives will usually fade in a few days, but can last up to 2 weeks.  Home care  Follow these tips:  · Try to find the cause of the hives and eliminate it. Discuss possible causes with your healthcare provider. Future reactions to the same allergen may be worse.  · Dont scratch the hives. Scratching will delay healing. To reduce itching, apply cool, wet compresses to the skin.  · Dress in soft, loose cotton clothing.  · Dont bathe in hot water. This can make the itching worse.  · Apply an ice pack or cool pack wrapped in a thin towel to your skin. This will help reduce redness and itching. But if your hives were caused by exposure to cold, then do not apply more cold to them.  · You may use over-the counter antihistamines to reduce itching. Some older antihistamines, such as diphenhydramine and chlorpheniramine, are inexpensive. But they need to be taken often and may make you sleepy. They are best used at bedtime. Dont use diphenhydramine if you have glaucoma or have trouble urinating because of an enlarged prostate. Newer antihistamines, such as loratadine, cetirizine, and fexofenadine, are generally more expensive. But they tend to have fewer side effects, such as drowsiness. They can be taken less often.  · Another type of antihistamine is used to treat heartburn. This type includes ranitidine, nizatidine, famotidine, and cimetidine. These are sometimes used along with the above antihistamines if a single medicine is not working.  Follow-up care  Follow up with your healthcare provider if your symptoms don't get better in 2 days. Ask your provider about allergy testing if you have had a severe reaction, or have had several episodes of hives. He or she can use the allergy testing to find out what you are allergic to.  When to seek medical advice  Call your healthcare provider right away if any of these occur:  · Fever of 100.4°F (38.0°C) or higher, or as directed by your  healthcare provider  · Redness, swelling, or pain  · Foul-smelling fluid coming from the rash  Call 911  Call 911 if any of the following occur:  · Swelling of the face, throat, or tongue  · Trouble breathing or swallowing  · Dizziness, weakness, or fainting  Date Last Reviewed: 9/1/2016  © 3628-0950 [employer]. [address] [place] [address]. All rights reserved. This information is not intended as a substitute for professional medical care. Always follow your healthcare professional's instructions.

## 2019-04-15 NOTE — PROGRESS NOTES
"Subjective:       Patient ID: Lucrecia Foster is a 69 y.o. female.    Vitals:  height is 5' 5" (1.651 m) and weight is 90.7 kg (200 lb). Her temperature is 98.8 °F (37.1 °C). Her blood pressure is 115/65 and her pulse is 79. Her respiration is 18 and oxygen saturation is 99%.     Chief Complaint: Rash    Patient thought she was breaking out with shingles on Saturday.  Had itching to flank hands chest hairline.  No new detergents lotions soaps plant contacts pets.  She went to the pharmacy who recommended aloe vera gel that has not been helping    Rash   This is a new problem. The current episode started in the past 7 days. The problem is unchanged. The affected locations include the torso, back, left hand and right hand. The rash is characterized by itchiness. She was exposed to nothing. Pertinent negatives include no cough, fever or sore throat. Treatments tried: burn relief jell. The treatment provided no relief.       Constitution: Negative for chills and fever.   HENT: Negative for facial swelling and sore throat.    Neck: Negative for painful lymph nodes.   Eyes: Negative for eye itching and eyelid swelling.   Respiratory: Negative for cough.    Musculoskeletal: Negative for joint pain and joint swelling.   Skin: Positive for rash and hives. Negative for color change, pale, wound, abrasion, laceration, lesion, skin thickening/induration, puncture wound, erythema, bruising, abscess and avulsion.   Allergic/Immunologic: Positive for hives and itching. Negative for environmental allergies and immunocompromised state.   Hematologic/Lymphatic: Negative for swollen lymph nodes.       Objective:      Physical Exam   Constitutional: She is oriented to person, place, and time. She appears well-developed and well-nourished.   HENT:   Head: Normocephalic and atraumatic. Head is without abrasion, without contusion and without laceration.   Right Ear: External ear normal.   Left Ear: External ear normal.   Nose: Nose " normal.   Mouth/Throat: Oropharynx is clear and moist. No posterior oropharyngeal edema.   Eyes: Pupils are equal, round, and reactive to light. Conjunctivae, EOM and lids are normal.   Neck: Trachea normal, full passive range of motion without pain and phonation normal. Neck supple.   Cardiovascular: Normal rate, regular rhythm and normal heart sounds.   Pulmonary/Chest: Effort normal and breath sounds normal. No stridor. No respiratory distress. She has no wheezes.   Musculoskeletal: Normal range of motion.   Neurological: She is alert and oriented to person, place, and time.   Skin: Skin is warm, dry and intact. Capillary refill takes less than 2 seconds. Rash noted. No abrasion, no bruising, no burn, no ecchymosis, no laceration and no lesion noted. Rash is urticarial. No erythema.        Psychiatric: She has a normal mood and affect. Her speech is normal and behavior is normal. Judgment and thought content normal. Cognition and memory are normal.   Nursing note and vitals reviewed.      Assessment:       1. Urticaria        Plan:         Urticaria  -     triamcinolone acetonide 0.1% (KENALOG) 0.1 % cream; Apply topically 3 (three) times daily. for 7 days  Dispense: 15 g; Refill: 0     Discussed oral antihistamine regimen    Patient Instructions   PLEASE READ YOUR DISCHARGE INSTRUCTIONS ENTIRELY AS IT CONTAINS IMPORTANT INFORMATION.      Please drink plenty of fluids.  Please get plenty of rest.  Please return here or go to the Emergency Department for any concerns or worsening of condition (worsening rash, difficulty swallowing, shortness of breath, passing out).      If you have a localized reaction it is ok to apply topical creams  as directed to the affected area.    Please take an over the counter antihistamine medication (allegra/Claritin/Zyrtec) of your choice as directed.  Benadryl at night - may make you drowsy do not drive after    Please follow up with your primary care doctor or specialist as  needed.    If you  smoke, please stop smoking.    Please arrange follow up with your primary medical clinic as soon as possible. You must understand that you've received an Urgent Care treatment only and that you may be released before all of your medical problems are known or treated. You, the patient, will arrange for follow up as instructed. If your symptoms worsen or fail to improve you should go to the Emergency Room.    Hives (Adult)  Hives are pink or red bumps on the skin. These bumps are also known as wheals. The bumps can itch, burn, or sting. Hives can occur anywhere on the body. They vary in size and shape and can form in clusters. Individual hives can appear and go away quickly. New hives may develop as old ones fade. Hives are common and usually harmless. Occasionally hives are a sign of a serious allergy.  Hives are often caused by an allergic reaction. It may be an allergic reaction to foods such as fruit, shellfish, chocolate, nuts, or tomatoes. It may be a reaction to pollens, animal fur, or mold spores. Medicines, chemicals, and insect bites can also cause hives. And hives can be caused by hot sun or cold air. The cause of hives can be difficult to find.  You may be given medicines to relieve swelling and itching. Follow all instructions when using these medicines. The hives will usually fade in a few days, but can last up to 2 weeks.  Home care  Follow these tips:  · Try to find the cause of the hives and eliminate it. Discuss possible causes with your healthcare provider. Future reactions to the same allergen may be worse.  · Dont scratch the hives. Scratching will delay healing. To reduce itching, apply cool, wet compresses to the skin.  · Dress in soft, loose cotton clothing.  · Dont bathe in hot water. This can make the itching worse.  · Apply an ice pack or cool pack wrapped in a thin towel to your skin. This will help reduce redness and itching. But if your hives were caused by exposure  to cold, then do not apply more cold to them.  · You may use over-the counter antihistamines to reduce itching. Some older antihistamines, such as diphenhydramine and chlorpheniramine, are inexpensive. But they need to be taken often and may make you sleepy. They are best used at bedtime. Dont use diphenhydramine if you have glaucoma or have trouble urinating because of an enlarged prostate. Newer antihistamines, such as loratadine, cetirizine, and fexofenadine, are generally more expensive. But they tend to have fewer side effects, such as drowsiness. They can be taken less often.  · Another type of antihistamine is used to treat heartburn. This type includes ranitidine, nizatidine, famotidine, and cimetidine. These are sometimes used along with the above antihistamines if a single medicine is not working.  Follow-up care  Follow up with your healthcare provider if your symptoms don't get better in 2 days. Ask your provider about allergy testing if you have had a severe reaction, or have had several episodes of hives. He or she can use the allergy testing to find out what you are allergic to.  When to seek medical advice  Call your healthcare provider right away if any of these occur:  · Fever of 100.4°F (38.0°C) or higher, or as directed by your healthcare provider  · Redness, swelling, or pain  · Foul-smelling fluid coming from the rash  Call 911  Call 911 if any of the following occur:  · Swelling of the face, throat, or tongue  · Trouble breathing or swallowing  · Dizziness, weakness, or fainting  Date Last Reviewed: 9/1/2016  © 5597-4006 [employer]. [address] [place] [address], [place] [address]. All rights reserved. This information is not intended as a substitute for professional medical care. Always follow your healthcare professional's instructions.

## 2020-09-28 ENCOUNTER — OFFICE VISIT (OUTPATIENT)
Dept: URGENT CARE | Facility: CLINIC | Age: 70
End: 2020-09-28
Payer: MEDICARE

## 2020-09-28 VITALS
SYSTOLIC BLOOD PRESSURE: 151 MMHG | TEMPERATURE: 98 F | HEART RATE: 85 BPM | OXYGEN SATURATION: 96 % | DIASTOLIC BLOOD PRESSURE: 85 MMHG

## 2020-09-28 DIAGNOSIS — M79.2 NEUROPATHIC PAIN: ICD-10-CM

## 2020-09-28 DIAGNOSIS — M79.602 LEFT ARM PAIN: Primary | ICD-10-CM

## 2020-09-28 PROCEDURE — 93005 ELECTROCARDIOGRAM TRACING: CPT | Mod: S$GLB,,, | Performed by: NURSE PRACTITIONER

## 2020-09-28 PROCEDURE — 99213 PR OFFICE/OUTPT VISIT, EST, LEVL III, 20-29 MIN: ICD-10-PCS | Mod: S$GLB,,, | Performed by: NURSE PRACTITIONER

## 2020-09-28 PROCEDURE — 99213 OFFICE O/P EST LOW 20 MIN: CPT | Mod: S$GLB,,, | Performed by: NURSE PRACTITIONER

## 2020-09-28 PROCEDURE — 93005 EKG 12-LEAD: ICD-10-PCS | Mod: S$GLB,,, | Performed by: NURSE PRACTITIONER

## 2020-09-28 PROCEDURE — 93010 EKG 12-LEAD: ICD-10-PCS | Mod: S$GLB,,, | Performed by: INTERNAL MEDICINE

## 2020-09-28 PROCEDURE — 93010 ELECTROCARDIOGRAM REPORT: CPT | Mod: S$GLB,,, | Performed by: INTERNAL MEDICINE

## 2020-09-28 RX ORDER — ATROPINE SULFATE 10 MG/ML
SOLUTION/ DROPS OPHTHALMIC
COMMUNITY
Start: 2020-08-26 | End: 2022-03-23

## 2020-09-28 RX ORDER — FUROSEMIDE 20 MG/1
20 TABLET ORAL DAILY
COMMUNITY
Start: 2020-08-07 | End: 2022-03-23

## 2020-09-28 RX ORDER — AMLODIPINE BESYLATE 10 MG/1
10 TABLET ORAL
COMMUNITY

## 2020-09-28 RX ORDER — VALSARTAN 80 MG/1
80 TABLET ORAL DAILY
Status: ON HOLD | COMMUNITY
Start: 2020-09-15 | End: 2023-12-19 | Stop reason: HOSPADM

## 2020-09-28 RX ORDER — GABAPENTIN 300 MG/1
CAPSULE ORAL
COMMUNITY
Start: 2020-08-26 | End: 2020-12-27 | Stop reason: SDUPTHER

## 2020-09-28 RX ORDER — DORZOLAMIDE HYDROCHLORIDE AND TIMOLOL MALEATE 20; 5 MG/ML; MG/ML
SOLUTION/ DROPS OPHTHALMIC
COMMUNITY
Start: 2020-09-16

## 2020-09-28 RX ORDER — NEPAFENAC 3 MG/ML
SUSPENSION/ DROPS OPHTHALMIC
COMMUNITY
Start: 2020-08-25 | End: 2022-03-23

## 2020-09-28 RX ORDER — LOTEPREDNOL ETABONATE 3.8 MG/G
GEL OPHTHALMIC
COMMUNITY
Start: 2020-08-25 | End: 2022-03-23

## 2020-09-28 NOTE — PROGRESS NOTES
Subjective:       Patient ID: Lucrecia Foster is a 70 y.o. female.    Vitals:  temperature is 98.4 °F (36.9 °C). Her blood pressure is 151/85 (abnormal) and her pulse is 85. Her oxygen saturation is 96%.     Chief Complaint: Arm Pain (L arm)    Pt presents with c o L arm pain x 2 days. Pt states cold sweats & sob. Pt states pain radiates from L shoulder to L wrist.    Provider note begins below:    Ms Foster is an otherwise healthy appearing adult AA female in NAD. She states she has had several days of left shoulder and arm pain. Pain is focal to right lateral bicep on exam. Pt able to perform PROM for elbow extension and flexion with pain worse with extension. She is unwilling to perform independent ROM due to pain. Patient also able to flex, extend, abduct and adduct passively to left shoulder without crepitus or bony impingement. She denies numbness or paresthesias. Patient is currently prescribed Gabapentin for chronic lower extremity neuropathy.    Pt with complaint of SOB and chest pain and EKG ordered. EKG grossly similar to EKG of 2007.  No weakness, dizziness, nausea, focal deficit, facial droop or visual/auditory deficit.  Left pupil fixed per patient 2/2 to surgical procedure. 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    Arm Pain   Incident onset: 2 days. The incident occurred at home. There was no injury mechanism. The pain is present in the left shoulder, left forearm and left wrist. The quality of the pain is described as aching. The pain radiates to the left hand. The pain is at a severity of 10/10. The pain has been constant since the incident. Associated symptoms include chest pain and tingling. Pertinent negatives include no muscle weakness or numbness. The symptoms are aggravated by lifting and movement. She has tried nothing for the symptoms.       Constitution: Positive for chills. Negative for fatigue and fever.   HENT: Negative for congestion and  sore throat.    Neck: Negative for painful lymph nodes.   Cardiovascular: Positive for chest pain. Negative for leg swelling.   Eyes: Negative for double vision and blurred vision.   Respiratory: Positive for chest tightness and shortness of breath. Negative for cough.    Gastrointestinal: Negative for nausea, vomiting and diarrhea.   Genitourinary: Negative for dysuria, frequency, urgency and history of kidney stones.   Musculoskeletal: Negative for joint pain, joint swelling, muscle cramps and muscle ache.   Skin: Negative for color change, pale, rash and bruising.   Allergic/Immunologic: Negative for seasonal allergies.   Neurological: Negative for dizziness, history of vertigo, light-headedness, passing out, headaches and numbness.   Hematologic/Lymphatic: Negative for swollen lymph nodes.   Psychiatric/Behavioral: Negative for nervous/anxious, sleep disturbance and depression. The patient is not nervous/anxious.        Objective:      Physical Exam   Constitutional: She is oriented to person, place, and time. She appears well-developed. She is cooperative.  Non-toxic appearance. She does not appear ill. No distress. obesity  HENT:   Head: Normocephalic and atraumatic.   Ears:   Right Ear: Hearing and external ear normal.   Left Ear: Hearing and external ear normal.   Nose: Nose normal. No mucosal edema, rhinorrhea or nasal deformity. No epistaxis.   Mouth/Throat: Mucous membranes are normal.   Eyes: Conjunctivae and lids are normal. Right eye exhibits no discharge. Left eye exhibits no discharge. No visual field deficit is present. No scleral icterus.      Comments: Left pupil fixed   Neck: Trachea normal, normal range of motion, full passive range of motion without pain and phonation normal. Neck supple.   Cardiovascular: Normal rate, regular rhythm, S2 normal, normal heart sounds and normal pulses. Exam reveals no gallop.   No murmur heard.  Pulses:       Carotid pulses are 2+ on the right side and 2+ on the  left side.       Radial pulses are 2+ on the right side and 2+ on the left side.        Posterior tibial pulses are 2+ on the right side and 2+ on the left side.   Pulmonary/Chest: Effort normal and breath sounds normal. No stridor. No respiratory distress. She has no decreased breath sounds. She has no wheezes. She has no rhonchi. She has no rales.   Abdominal: Soft. Normal appearance and bowel sounds are normal. She exhibits no distension, no pulsatile midline mass and no mass. There is no abdominal tenderness.   Musculoskeletal: Normal range of motion.         General: No deformity.   Neurological: She is alert and oriented to person, place, and time. She has normal motor skills, normal sensation and intact cranial nerves. She displays no tremor and facial symmetry. No cranial nerve deficit. She exhibits normal muscle tone. She shows no pronator drift. Gait and coordination normal. Coordination normal.   Skin: Skin is warm, dry, intact, not diaphoretic and not pale. Psychiatric: Her speech is normal and behavior is normal. Judgment and thought content normal.   Nursing note and vitals reviewed.    EKG: HR 78. Unchanged from previous tracings, normal sinus rhythm, nonspecific T waves abnormality.        Assessment:       1. Left arm pain    2. Neuropathic pain        Plan:         Left arm pain  -     IN OFFICE EKG 12-LEAD (to [facility])    Neuropathic pain      Patient Instructions   Please begin taking your prescribed Gabapentin on a more regular basis for your current pain (3- 4 times per day evenly spaced). Please discuss your pain at your upcoming Neurology visit.    If your pain is not relieved by the gabapentin, or if it worsens or is associated with new onset of different symptoms (ie weakness, fatigue, dizziness, nausea, chest pain, confusion, etc.), call 911 and go immediately to the Emergency Department as we discussed.      Peripheral Neuropathy  Peripheral neuropathy is a condition that affects the nerves  of the arms or legs. It causes a change in physical feeling. Sometimes it causes weakness in the muscles. You may feel tingling, numbness or shooting pains. Symptoms may be more common at night. Skin may be extra sensitive to light touch or temperature changes.  Neuropathy may be a complication of a chronic disease such as diabetes. A ruptured disk with pressure on the spinal nerve may also lead to the problem. Certain vitamin deficiencies may lead to it. It may also be caused by exposure to certain drugs or chemicals.  Home care  · Tell the healthcare provider about all medicines you take. This includes prescription and over-the-counter medicines, vitamins, and herbs. Ask if any of the medicines may be causing your problems. Do not make any changes to prescription medicines without talking to your healthcare provider first.  · You may be prescribed medicines to help relieve the tingling feeling or for pain. Take all medicines as directed.  · A numb hand or foot may be more prone to injury. To help protect it:  ¨ Always use oven mitts.  ¨ Test water with an unaffected hand or foot.  ¨ Use caution when trimming nails. File sharp areas.  ¨ Wear shoes that fit well to avoid pressure points, blisters, and ulcers.  ¨ Inspect your hands and feet carefully (including the soles of your feet and between your toes) at least once a week. If you see red areas, sores, or other problems, tell your healthcare provider.  Follow-up care  Follow up with your doctor or as advised by our staff. You may need further testing or evaluation.  When to seek medical advice  Call your healthcare provider right away if any of the following occur:  · Redness, swelling, cracking, or ulcer on any numb area, especially the feet  · New symptoms of numbness or muscle weakness numbness  · Loss of bowel or bladder control  · Slurred speech, confusion, or trouble speaking, walking, or seeing  Date Last Reviewed: 9/26/2015  © 1869-5453 [employer]. [address] [place] [address], [place] [address]. All rights reserved. This information is not intended as a substitute for professional medical care. Always follow your healthcare professional's instructions.

## 2020-09-28 NOTE — PATIENT INSTRUCTIONS
Please begin taking your prescribed Gabapentin on a more regular basis for your current pain (3- 4 times per day evenly spaced). Please discuss your pain at your upcoming Neurology visit.    If your pain is not relieved by the gabapentin, or if it worsens or is associated with new onset of different symptoms (ie weakness, fatigue, dizziness, nausea, chest pain, confusion, etc.), call 911 and go immediately to the Emergency Department as we discussed.      Peripheral Neuropathy  Peripheral neuropathy is a condition that affects the nerves of the arms or legs. It causes a change in physical feeling. Sometimes it causes weakness in the muscles. You may feel tingling, numbness or shooting pains. Symptoms may be more common at night. Skin may be extra sensitive to light touch or temperature changes.  Neuropathy may be a complication of a chronic disease such as diabetes. A ruptured disk with pressure on the spinal nerve may also lead to the problem. Certain vitamin deficiencies may lead to it. It may also be caused by exposure to certain drugs or chemicals.  Home care  · Tell the healthcare provider about all medicines you take. This includes prescription and over-the-counter medicines, vitamins, and herbs. Ask if any of the medicines may be causing your problems. Do not make any changes to prescription medicines without talking to your healthcare provider first.  · You may be prescribed medicines to help relieve the tingling feeling or for pain. Take all medicines as directed.  · A numb hand or foot may be more prone to injury. To help protect it:  ¨ Always use oven mitts.  ¨ Test water with an unaffected hand or foot.  ¨ Use caution when trimming nails. File sharp areas.  ¨ Wear shoes that fit well to avoid pressure points, blisters, and ulcers.  ¨ Inspect your hands and feet carefully (including the soles of your feet and between your toes) at least once a week. If you see red areas, sores, or other problems, tell  your healthcare provider.  Follow-up care  Follow up with your doctor or as advised by our staff. You may need further testing or evaluation.  When to seek medical advice  Call your healthcare provider right away if any of the following occur:  · Redness, swelling, cracking, or ulcer on any numb area, especially the feet  · New symptoms of numbness or muscle weakness numbness  · Loss of bowel or bladder control  · Slurred speech, confusion, or trouble speaking, walking, or seeing  Date Last Reviewed: 9/26/2015 © 2000-2017 [employer]. [address] [place] [id number]. All rights reserved. This information is not intended as a substitute for professional medical care. Always follow your healthcare professional's instructions.

## 2020-12-27 ENCOUNTER — OFFICE VISIT (OUTPATIENT)
Dept: URGENT CARE | Facility: CLINIC | Age: 70
End: 2020-12-27
Payer: MEDICARE

## 2020-12-27 VITALS
TEMPERATURE: 98 F | SYSTOLIC BLOOD PRESSURE: 159 MMHG | DIASTOLIC BLOOD PRESSURE: 80 MMHG | OXYGEN SATURATION: 96 % | RESPIRATION RATE: 18 BRPM | HEART RATE: 80 BPM
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
## 2020-12-27 DIAGNOSIS — M25.512 ACUTE PAIN OF LEFT SHOULDER: ICD-10-CM

## 2020-12-27 DIAGNOSIS — S43.402A SPRAIN OF LEFT SHOULDER, UNSPECIFIED SHOULDER SPRAIN TYPE, INITIAL ENCOUNTER: Primary | ICD-10-CM

## 2020-12-27 PROCEDURE — 73030 XR SHOULDER TRAUMA 3 VIEW LEFT: ICD-10-PCS | Mod: FY,LT,S$GLB, | Performed by: RADIOLOGY

## 2020-12-27 PROCEDURE — 99214 PR OFFICE/OUTPT VISIT, EST, LEVL IV, 30-39 MIN: ICD-10-PCS | Mod: S$GLB,,, | Performed by: NURSE PRACTITIONER

## 2020-12-27 PROCEDURE — 73030 X-RAY EXAM OF SHOULDER: CPT | Mod: FY,LT,S$GLB, | Performed by: RADIOLOGY

## 2020-12-27 PROCEDURE — 99214 OFFICE O/P EST MOD 30 MIN: CPT | Mod: S$GLB,,, | Performed by: NURSE PRACTITIONER

## 2020-12-27 RX ORDER — NAPROXEN 500 MG/1
500 TABLET ORAL 2 TIMES DAILY WITH MEALS
Qty: 20 TABLET | Refills: 0 | Status: SHIPPED | OUTPATIENT
Start: 2020-12-27 | End: 2021-01-06

## 2020-12-27 RX ORDER — GABAPENTIN 300 MG/1
300 CAPSULE ORAL 3 TIMES DAILY
Qty: 21 CAPSULE | Refills: 0 | Status: SHIPPED | OUTPATIENT
Start: 2020-12-27 | End: 2021-01-03

## 2020-12-27 NOTE — PATIENT INSTRUCTIONS
Please follow up with your Orthopedist at the first availability as we discussed.    Shoulder Sprain  A sprain is a stretching or tearing of the ligaments that hold a joint together. A sprain may take up to 8 weeks to fully heal, depending on how severe it is. Moderate to severe shoulder sprains are treated with a sling or shoulder immobilizer. Minor sprains can be treated without any special support.  Home care  The following guidelines will help you care for your injury at home:  · If a sling was given to you, leave it in place for the time advised by your healthcare provider. If you arent sure how long to wear it, ask for advice. If the sling becomes loose, adjust it so that your forearm is level with the ground. Your shoulder should feel well supported.  · Put an ice pack on the injured area for 20 minutes every 1 to 2 hours the first day. You can make your own ice pack by putting ice cubes in a plastic bag. A bag of frozen peas or something similar works well too. Wrap the bag in a thin towel. Continue with ice packs 3 to 4 times a day for the next 2 to 3 days. Then use the pack as needed to ease pain and swelling.  · You may use acetaminophen or ibuprofen to control pain, unless another pain medicine was prescribed. If you have chronic liver or kidney disease, talk with your healthcare provider before using these medicines. Also talk with your provider if youve had a stomach ulcer or gastrointestinal bleeding.  · Shoulder joints become stiff if left in a sling for too long. You should start range of motion exercises about 7 to 10 days after the injury. Talk with your provider to find out what type of exercises to do and how soon to start.  Follow-up care  Follow up with your healthcare provider, or as advised.  Any X-rays you had today dont show any broken bones, breaks, or fractures. Sometimes fractures dont show up on the first X-ray. Bruises and sprains can sometimes hurt as much as a fracture. These  injuries can take time to heal completely. If your symptoms dont improve or they get worse, talk with your provider. You may need a repeat X-ray or other treatments.  When to seek medical advice  Call your healthcare provider right away if any of these occur:  · Shoulder pain or swelling in your arm that gets worse  · Fingers become cold, blue, numb, or tingly  · Large amount of bruising of the shoulder or upper arm  · Fever or chills  Date Last Reviewed: 8/1/2016 © 2000-2017 [employer]. [address] [place] [address], [place] [address]. All rights reserved. This information is not intended as a substitute for professional medical care. Always follow your healthcare professional's instructions.

## 2020-12-27 NOTE — PROGRESS NOTES
Subjective:       Patient ID: Lucrecia Foster is a 70 y.o. female.    Vitals:  temperature is 98 °F (36.7 °C). Her blood pressure is 159/80 (abnormal) and her pulse is 80. Her respiration is 18 and oxygen saturation is 96%.     Chief Complaint: Shoulder Pain    Patient presents with c.o fall that occurred on Christmas Eve. Patient states she fell on to the left side. No head trauma. Patient with signifcantlly reduced range of motion to left arm. Has taken otc nsaids and tylenol which has not helped. Patient states she is out of her Neurontin.     Provider note begins below:    Patient states she fell to left side from standing to wooden floor at home 4 nights ago. No numbness or paresthesias but endorses significant pain to left shoulder. Adequate ROM to left elbow and wrist and hand with good left radial pulses, distal phalanges cap refill < 3 sec and adequate  strength.    Patient states she has chronic neck and back and hip pain and no significant changes. Patient ambulates freely and without notable pain.    Shoulder Pain   Pain location: left shoulder. This is a new problem. Episode onset: 12/24/20. There has been a history of trauma. The problem occurs constantly. The problem has been unchanged. The quality of the pain is described as aching. The pain is at a severity of 10/10. Associated symptoms include a limited range of motion. The symptoms are aggravated by activity. She has tried NSAIDS and acetaminophen for the symptoms. The treatment provided no relief.       Constitution: Negative for fatigue.   HENT: Negative for facial swelling and facial trauma.    Neck: Negative for neck stiffness.   Cardiovascular: Negative for chest trauma.   Eyes: Negative for eye trauma, double vision and blurred vision.   Gastrointestinal: Negative for abdominal trauma, abdominal pain and rectal bleeding.   Genitourinary: Negative for hematuria, missed menses, genital trauma and pelvic pain.   Musculoskeletal: Positive  for pain, trauma and abnormal ROM of joint. Negative for joint swelling.   Skin: Negative for color change, wound, abrasion, laceration and bruising.   Neurological: Negative for dizziness, history of vertigo, light-headedness, coordination disturbances, altered mental status and loss of consciousness.   Hematologic/Lymphatic: Negative for history of bleeding disorder.   Psychiatric/Behavioral: Negative for altered mental status.       Objective:      Physical Exam   Constitutional: She is oriented to person, place, and time. She appears well-developed. She is cooperative.  Non-toxic appearance. No distress. obesity  HENT:   Head: Normocephalic and atraumatic.   Ears:   Right Ear: Hearing and external ear normal.   Left Ear: Hearing and external ear normal.   Nose: Nose normal.   Mouth/Throat: Mucous membranes are normal.   Eyes: Conjunctivae and lids are normal.   Neck: Trachea normal, normal range of motion, full passive range of motion without pain and phonation normal. Neck supple. No neck rigidity. Normal range of motion present.   Cardiovascular: Normal rate, regular rhythm, normal heart sounds and normal pulses.   Pulses:       Radial pulses are 2+ on the left side.   Pulmonary/Chest: Effort normal and breath sounds normal.   Abdominal: Soft. Normal appearance and bowel sounds are normal. She exhibits no abdominal bruit, no pulsatile midline mass and no mass.   Musculoskeletal:         General: No deformity.      Left shoulder: She exhibits decreased range of motion, tenderness, bony tenderness and pain. She exhibits no swelling, no effusion, no crepitus, normal pulse and normal strength.   Neurological: She is alert and oriented to person, place, and time. She has normal strength and normal reflexes. No sensory deficit. Gait and coordination normal.   Skin: Skin is warm, dry, intact and not diaphoretic. Psychiatric: Her speech is normal and behavior is normal. Judgment and thought content normal.   Nursing  note and vitals reviewed.    X-ray Shoulder Trauma 3 View Left    Result Date: 12/27/2020  EXAMINATION: XR SHOULDER TRAUMA 3 VIEW LEFT CLINICAL HISTORY: Pain in left shoulder TECHNIQUE: Three views of the left shoulder were performed. FINDINGS: There is a 2.1 cm calcification projecting over the superior glenohumeral joint space.  This may represent undersurface spur extending off the acromion versus calcification of the rotator cuff or intra-articular calcification.  There is no acute fracture, dislocation, or bony erosion.     As above. Electronically signed by: Nathan River MD Date:    12/27/2020 Time:    16:33        Assessment:       1. Acute pain of left shoulder    2. Fall, initial encounter        Plan:       Patient Instructions     Please follow up with your Orthopedist at the first availability as we discussed.    Shoulder Sprain  A sprain is a stretching or tearing of the ligaments that hold a joint together. A sprain may take up to 8 weeks to fully heal, depending on how severe it is. Moderate to severe shoulder sprains are treated with a sling or shoulder immobilizer. Minor sprains can be treated without any special support.  Home care  The following guidelines will help you care for your injury at home:  · If a sling was given to you, leave it in place for the time advised by your healthcare provider. If you arent sure how long to wear it, ask for advice. If the sling becomes loose, adjust it so that your forearm is level with the ground. Your shoulder should feel well supported.  · Put an ice pack on the injured area for 20 minutes every 1 to 2 hours the first day. You can make your own ice pack by putting ice cubes in a plastic bag. A bag of frozen peas or something similar works well too. Wrap the bag in a thin towel. Continue with ice packs 3 to 4 times a day for the next 2 to 3 days. Then use the pack as needed to ease pain and swelling.  · You may use acetaminophen or ibuprofen to control pain,  unless another pain medicine was prescribed. If you have chronic liver or kidney disease, talk with your healthcare provider before using these medicines. Also talk with your provider if youve had a stomach ulcer or gastrointestinal bleeding.  · Shoulder joints become stiff if left in a sling for too long. You should start range of motion exercises about 7 to 10 days after the injury. Talk with your provider to find out what type of exercises to do and how soon to start.  Follow-up care  Follow up with your healthcare provider, or as advised.  Any X-rays you had today dont show any broken bones, breaks, or fractures. Sometimes fractures dont show up on the first X-ray. Bruises and sprains can sometimes hurt as much as a fracture. These injuries can take time to heal completely. If your symptoms dont improve or they get worse, talk with your provider. You may need a repeat X-ray or other treatments.  When to seek medical advice  Call your healthcare provider right away if any of these occur:  · Shoulder pain or swelling in your arm that gets worse  · Fingers become cold, blue, numb, or tingly  · Large amount of bruising of the shoulder or upper arm  · Fever or chills  Date Last Reviewed: 8/1/2016  © 0207-4107 [email or web address]. [address] [place] [address], [place] [address]. All rights reserved. This information is not intended as a substitute for professional medical care. Always follow your healthcare professional's instructions.        Patient placed in supportive sling and tolerated well with NAD.    Acute pain of left shoulder  -     X-Ray Shoulder Trauma 3 view Left; Future; Expected date: 12/27/2020    Fall, initial encounter

## 2021-11-15 ENCOUNTER — TELEPHONE (OUTPATIENT)
Dept: DERMATOLOGY | Facility: CLINIC | Age: 71
End: 2021-11-15
Payer: MEDICARE

## 2021-11-19 ENCOUNTER — IMMUNIZATION (OUTPATIENT)
Dept: PRIMARY CARE CLINIC | Facility: CLINIC | Age: 71
End: 2021-11-19
Payer: MEDICARE

## 2021-11-19 DIAGNOSIS — Z23 NEED FOR VACCINATION: Primary | ICD-10-CM

## 2021-11-19 PROCEDURE — 0004A COVID-19, MRNA, LNP-S, PF, 30 MCG/0.3 ML DOSE VACCINE: CPT | Mod: CV19,PBBFAC | Performed by: INTERNAL MEDICINE

## 2022-02-02 ENCOUNTER — OFFICE VISIT (OUTPATIENT)
Dept: OPHTHALMOLOGY | Facility: CLINIC | Age: 72
End: 2022-02-02
Payer: MEDICARE

## 2022-02-02 ENCOUNTER — OFFICE VISIT (OUTPATIENT)
Dept: URGENT CARE | Facility: CLINIC | Age: 72
End: 2022-02-02
Payer: MEDICARE

## 2022-02-02 VITALS
DIASTOLIC BLOOD PRESSURE: 78 MMHG | OXYGEN SATURATION: 96 % | HEART RATE: 70 BPM | RESPIRATION RATE: 18 BRPM | BODY MASS INDEX: 33.15 KG/M2 | TEMPERATURE: 99 F | WEIGHT: 199 LBS | HEIGHT: 65 IN | SYSTOLIC BLOOD PRESSURE: 129 MMHG

## 2022-02-02 DIAGNOSIS — H57.89 PERIORBITAL SWELLING: Primary | ICD-10-CM

## 2022-02-02 DIAGNOSIS — L03.213 PRESEPTAL CELLULITIS OF RIGHT LOWER EYELID: Primary | ICD-10-CM

## 2022-02-02 PROCEDURE — 1125F AMNT PAIN NOTED PAIN PRSNT: CPT | Mod: CPTII,S$GLB,, | Performed by: OPHTHALMOLOGY

## 2022-02-02 PROCEDURE — 1159F MED LIST DOCD IN RCRD: CPT | Mod: CPTII,S$GLB,, | Performed by: OPHTHALMOLOGY

## 2022-02-02 PROCEDURE — 1160F PR REVIEW ALL MEDS BY PRESCRIBER/CLIN PHARMACIST DOCUMENTED: ICD-10-PCS | Mod: CPTII,S$GLB,, | Performed by: OPHTHALMOLOGY

## 2022-02-02 PROCEDURE — 99213 OFFICE O/P EST LOW 20 MIN: CPT | Mod: S$GLB,,,

## 2022-02-02 PROCEDURE — 99203 OFFICE O/P NEW LOW 30 MIN: CPT | Mod: S$GLB,,, | Performed by: OPHTHALMOLOGY

## 2022-02-02 PROCEDURE — 99203 PR OFFICE/OUTPT VISIT, NEW, LEVL III, 30-44 MIN: ICD-10-PCS | Mod: S$GLB,,, | Performed by: OPHTHALMOLOGY

## 2022-02-02 PROCEDURE — 4010F PR ACE/ARB THEARPY RXD/TAKEN: ICD-10-PCS | Mod: CPTII,S$GLB,, | Performed by: OPHTHALMOLOGY

## 2022-02-02 PROCEDURE — 99213 PR OFFICE/OUTPT VISIT, EST, LEVL III, 20-29 MIN: ICD-10-PCS | Mod: S$GLB,,,

## 2022-02-02 PROCEDURE — 1125F PR PAIN SEVERITY QUANTIFIED, PAIN PRESENT: ICD-10-PCS | Mod: CPTII,S$GLB,,

## 2022-02-02 PROCEDURE — 1125F AMNT PAIN NOTED PAIN PRSNT: CPT | Mod: CPTII,S$GLB,,

## 2022-02-02 PROCEDURE — 3078F PR MOST RECENT DIASTOLIC BLOOD PRESSURE < 80 MM HG: ICD-10-PCS | Mod: CPTII,S$GLB,,

## 2022-02-02 PROCEDURE — 3074F PR MOST RECENT SYSTOLIC BLOOD PRESSURE < 130 MM HG: ICD-10-PCS | Mod: CPTII,S$GLB,,

## 2022-02-02 PROCEDURE — 3008F BODY MASS INDEX DOCD: CPT | Mod: CPTII,S$GLB,,

## 2022-02-02 PROCEDURE — 1160F RVW MEDS BY RX/DR IN RCRD: CPT | Mod: CPTII,S$GLB,, | Performed by: OPHTHALMOLOGY

## 2022-02-02 PROCEDURE — 4010F PR ACE/ARB THEARPY RXD/TAKEN: ICD-10-PCS | Mod: CPTII,S$GLB,,

## 2022-02-02 PROCEDURE — 3078F DIAST BP <80 MM HG: CPT | Mod: CPTII,S$GLB,,

## 2022-02-02 PROCEDURE — 3008F PR BODY MASS INDEX (BMI) DOCUMENTED: ICD-10-PCS | Mod: CPTII,S$GLB,,

## 2022-02-02 PROCEDURE — 4010F ACE/ARB THERAPY RXD/TAKEN: CPT | Mod: CPTII,S$GLB,,

## 2022-02-02 PROCEDURE — 99999 PR PBB SHADOW E&M-EST. PATIENT-LVL IV: ICD-10-PCS | Mod: PBBFAC,,, | Performed by: OPHTHALMOLOGY

## 2022-02-02 PROCEDURE — 1125F PR PAIN SEVERITY QUANTIFIED, PAIN PRESENT: ICD-10-PCS | Mod: CPTII,S$GLB,, | Performed by: OPHTHALMOLOGY

## 2022-02-02 PROCEDURE — 4010F ACE/ARB THERAPY RXD/TAKEN: CPT | Mod: CPTII,S$GLB,, | Performed by: OPHTHALMOLOGY

## 2022-02-02 PROCEDURE — 3074F SYST BP LT 130 MM HG: CPT | Mod: CPTII,S$GLB,,

## 2022-02-02 PROCEDURE — 99999 PR PBB SHADOW E&M-EST. PATIENT-LVL IV: CPT | Mod: PBBFAC,,, | Performed by: OPHTHALMOLOGY

## 2022-02-02 PROCEDURE — 1159F PR MEDICATION LIST DOCUMENTED IN MEDICAL RECORD: ICD-10-PCS | Mod: CPTII,S$GLB,, | Performed by: OPHTHALMOLOGY

## 2022-02-02 RX ORDER — GLIMEPIRIDE 1 MG/1
1 TABLET ORAL
COMMUNITY
End: 2022-03-23

## 2022-02-02 RX ORDER — NITROFURANTOIN 25; 75 MG/1; MG/1
100 CAPSULE ORAL 2 TIMES DAILY
COMMUNITY
Start: 2022-01-06 | End: 2022-03-10 | Stop reason: ALTCHOICE

## 2022-02-02 RX ORDER — AMOXICILLIN AND CLAVULANATE POTASSIUM 875; 125 MG/1; MG/1
1 TABLET, FILM COATED ORAL 2 TIMES DAILY
Qty: 20 TABLET | Refills: 0 | Status: SHIPPED | OUTPATIENT
Start: 2022-02-02 | End: 2022-02-12

## 2022-02-02 RX ORDER — PREDNISOLONE ACETATE 10 MG/ML
SUSPENSION/ DROPS OPHTHALMIC
COMMUNITY
Start: 2021-11-01 | End: 2022-03-23

## 2022-02-02 RX ORDER — EMPAGLIFLOZIN AND LINAGLIPTIN 10; 5 MG/1; MG/1
TABLET, FILM COATED ORAL
COMMUNITY
End: 2022-03-23

## 2022-02-02 RX ORDER — METHOCARBAMOL 500 MG/1
500 TABLET, FILM COATED ORAL 3 TIMES DAILY PRN
COMMUNITY
Start: 2022-01-03 | End: 2022-03-23

## 2022-02-02 RX ORDER — DORZOLAMIDE HCL 20 MG/ML
SOLUTION/ DROPS OPHTHALMIC
COMMUNITY
Start: 2021-09-13 | End: 2022-03-23

## 2022-02-02 RX ORDER — ALCLOMETASONE DIPROPIONATE 0.5 MG/G
OINTMENT TOPICAL 3 TIMES DAILY
COMMUNITY
Start: 2021-12-22

## 2022-02-02 NOTE — PROGRESS NOTES
HPI     Triage pt  Patient states OD swollen, itchy, burning and stinging x this morning.  Discharge and tearing.  Been experiencing orbital swelling past week.  Eye pain--10 on pain scale.  Hx of Cataract Surgery OS--3 years ago--([facility])    Eye drops:Pred Forte TID OS                   Dorzolomide TID OS                   Atropine TID OS                   Proclensa TID OS    I have personally interviewed the patient, reviewed the history and   examined the patient and agree with the technician's exam.     Last edited by Luciano Hartmann MD on 2/2/2022 11:06 AM. (History)            Assessment /Plan     For exam results, see Encounter Report.    Preseptal cellulitis of right lower eyelid  -     amoxicillin-clavulanate 875-125mg (AUGMENTIN) 875-125 mg per tablet; Take 1 tablet by mouth 2 (two) times daily. for 10 days  Dispense: 20 tablet; Refill: 0      Antibiotics as above. Repeat exam in one week. Call sooner if any signs of worsening. Will address issue of glaucoma once infection resolved.

## 2022-02-02 NOTE — PROGRESS NOTES
"Subjective:       Patient ID: Lucrecia Foster is a 71 y.o. female.    Vitals:  height is 5' 5" (1.651 m) and weight is 90.3 kg (199 lb). Her temporal temperature is 98.6 °F (37 °C). Her blood pressure is 129/78 and her pulse is 70. Her respiration is 18 and oxygen saturation is 96%.     Chief Complaint: Eye Problem    Pt c/o eyelid swelling that is painful and itchy. Pt states she normally breaks out in rashes, but she is unsure of what causes. Sx have been going on since this morning. She states that she is having blurred vision and a thick eye discharge. She woke up with eyes crusted over. She denies fever, body aches or chills.     Eye Pain   Both eyes are affected.This is a new problem. The current episode started yesterday. The problem occurs constantly. The problem has been gradually worsening. There was no injury mechanism. The pain is at a severity of 8/10. The pain is severe. There is no known exposure to pink eye. She does not wear contacts. Associated symptoms include an eye discharge, eye redness and itching. Pertinent negatives include no blurred vision, double vision, fever, foreign body sensation, nausea, photophobia, recent URI or vomiting. She has tried eye drops for the symptoms. The treatment provided no relief.       Constitution: Negative for chills, sweating, fatigue and fever.   HENT: Positive for facial swelling. Negative for ear pain and tinnitus.    Neck: Negative for neck pain and neck stiffness.   Eyes: Positive for eye discharge, eye itching, eye pain, eye redness and eyelid swelling. Negative for eye trauma, foreign body in eye, photophobia, vision loss, double vision and blurred vision.   Respiratory: Negative for cough and sputum production.    Gastrointestinal: Negative for nausea, vomiting and diarrhea.   Musculoskeletal: Negative for pain and trauma.   Skin: Negative for color change, pale, rash and hives.   Allergic/Immunologic: Negative for environmental allergies, seasonal " allergies, hives and itching.   Neurological: Negative for dizziness, disorientation and altered mental status.   Psychiatric/Behavioral: Negative for altered mental status, disorientation and confusion.       Objective:      Physical Exam   Constitutional: She is oriented to person, place, and time. She appears well-developed and well-nourished.   HENT:   Head: Normocephalic and atraumatic.   Ears:   Right Ear: External ear normal.   Left Ear: External ear normal.   Nose: Nose normal.   Mouth/Throat: Oropharynx is clear and moist.   Eyes: EOM and lids are normal. Pupils are equal, round, and reactive to light. Right eye exhibits discharge and exudate. Right eye exhibits no chemosis. Left eye exhibits discharge and exudate. Left eye exhibits no chemosis. Right conjunctiva is injected. Right conjunctiva has no hemorrhage. Left conjunctiva is injected. Left conjunctiva has no hemorrhage. Right eye exhibits normal extraocular motion. Left eye exhibits normal extraocular motion.        Comments: Pain in right eye with extraocular movements. PERRLA   Neck: Trachea normal and phonation normal. Neck supple.   Musculoskeletal: Normal range of motion.         General: Normal range of motion.   Neurological: She is alert and oriented to person, place, and time.   Skin: Skin is warm, dry and intact.   Psychiatric: She has a normal mood and affect. Her speech is normal and behavior is normal. Judgment and thought content normal. Cognition and memory  Nursing note and vitals reviewed.           Hearing Screening    125Hz 250Hz 500Hz 1000Hz 2000Hz 3000Hz 4000Hz 6000Hz 8000Hz   Right ear:            Left ear:               Visual Acuity Screening    Right eye Left eye Both eyes   Without correction: 20/30 20/200    With correction:          Assessment:       1. Periorbital swelling          Plan:         Periorbital swelling  -     Ambulatory referral/consult to Ophthalmology           Medical Decision Making:   Differential  Diagnosis:   Preseptal cellulitis, orbital cellulitis, bilateral bacterial/viral/allergic conjunctivitis  Urgent Care Management:  Appointment made for Ophthalmology for 10:45 AM today for further evaluation.

## 2022-02-09 ENCOUNTER — OFFICE VISIT (OUTPATIENT)
Dept: OPHTHALMOLOGY | Facility: CLINIC | Age: 72
End: 2022-02-09
Payer: MEDICARE

## 2022-02-09 DIAGNOSIS — L03.213 PRESEPTAL CELLULITIS OF RIGHT LOWER EYELID: Primary | ICD-10-CM

## 2022-02-09 PROCEDURE — 4010F ACE/ARB THERAPY RXD/TAKEN: CPT | Mod: CPTII,S$GLB,, | Performed by: OPHTHALMOLOGY

## 2022-02-09 PROCEDURE — 99212 PR OFFICE/OUTPT VISIT, EST, LEVL II, 10-19 MIN: ICD-10-PCS | Mod: S$GLB,,, | Performed by: OPHTHALMOLOGY

## 2022-02-09 PROCEDURE — 1159F PR MEDICATION LIST DOCUMENTED IN MEDICAL RECORD: ICD-10-PCS | Mod: CPTII,S$GLB,, | Performed by: OPHTHALMOLOGY

## 2022-02-09 PROCEDURE — 4010F PR ACE/ARB THEARPY RXD/TAKEN: ICD-10-PCS | Mod: CPTII,S$GLB,, | Performed by: OPHTHALMOLOGY

## 2022-02-09 PROCEDURE — 99999 PR PBB SHADOW E&M-EST. PATIENT-LVL III: CPT | Mod: PBBFAC,,, | Performed by: OPHTHALMOLOGY

## 2022-02-09 PROCEDURE — 1160F PR REVIEW ALL MEDS BY PRESCRIBER/CLIN PHARMACIST DOCUMENTED: ICD-10-PCS | Mod: CPTII,S$GLB,, | Performed by: OPHTHALMOLOGY

## 2022-02-09 PROCEDURE — 1160F RVW MEDS BY RX/DR IN RCRD: CPT | Mod: CPTII,S$GLB,, | Performed by: OPHTHALMOLOGY

## 2022-02-09 PROCEDURE — 1159F MED LIST DOCD IN RCRD: CPT | Mod: CPTII,S$GLB,, | Performed by: OPHTHALMOLOGY

## 2022-02-09 PROCEDURE — 99212 OFFICE O/P EST SF 10 MIN: CPT | Mod: S$GLB,,, | Performed by: OPHTHALMOLOGY

## 2022-02-09 PROCEDURE — 99999 PR PBB SHADOW E&M-EST. PATIENT-LVL III: ICD-10-PCS | Mod: PBBFAC,,, | Performed by: OPHTHALMOLOGY

## 2022-02-09 NOTE — PROGRESS NOTES
HPI     DLS:02/02/2022 Mary Kate  Patient here for 1 week follow up Preseptal cellulitis RLL.  Pt states RLL swelling has gone down.    I have personally interviewed the patient, reviewed the history and   examined the patient and agree with the technician's exam.     Eye drops:Eye drops:Pred Forte T ID OS                    Dorzolomide TID OS                    Atropine TID OS                    Proclensa TID OS     Last edited by Luciano Hartmann MD on 2/9/2022  3:54 PM. (History)            Assessment /Plan     For exam results, see Encounter Report.    Preseptal cellulitis of right lower eyelid      Cellulitis responding well to antibiotics. Finish antibiotics. Return as needed.

## 2022-03-06 ENCOUNTER — OFFICE VISIT (OUTPATIENT)
Dept: URGENT CARE | Facility: CLINIC | Age: 72
End: 2022-03-06
Payer: MEDICARE

## 2022-03-06 VITALS
OXYGEN SATURATION: 96 % | WEIGHT: 199 LBS | DIASTOLIC BLOOD PRESSURE: 90 MMHG | HEIGHT: 65 IN | TEMPERATURE: 98 F | HEART RATE: 64 BPM | SYSTOLIC BLOOD PRESSURE: 165 MMHG | BODY MASS INDEX: 33.15 KG/M2 | RESPIRATION RATE: 16 BRPM

## 2022-03-06 DIAGNOSIS — T78.3XXA ANGIOEDEMA, INITIAL ENCOUNTER: Primary | ICD-10-CM

## 2022-03-06 DIAGNOSIS — J31.0 RHINITIS, UNSPECIFIED TYPE: ICD-10-CM

## 2022-03-06 PROCEDURE — 3008F BODY MASS INDEX DOCD: CPT | Mod: CPTII,S$GLB,, | Performed by: NURSE PRACTITIONER

## 2022-03-06 PROCEDURE — 1160F PR REVIEW ALL MEDS BY PRESCRIBER/CLIN PHARMACIST DOCUMENTED: ICD-10-PCS | Mod: CPTII,S$GLB,, | Performed by: NURSE PRACTITIONER

## 2022-03-06 PROCEDURE — 3080F PR MOST RECENT DIASTOLIC BLOOD PRESSURE >= 90 MM HG: ICD-10-PCS | Mod: CPTII,S$GLB,, | Performed by: NURSE PRACTITIONER

## 2022-03-06 PROCEDURE — 4010F ACE/ARB THERAPY RXD/TAKEN: CPT | Mod: CPTII,S$GLB,, | Performed by: NURSE PRACTITIONER

## 2022-03-06 PROCEDURE — 1160F RVW MEDS BY RX/DR IN RCRD: CPT | Mod: CPTII,S$GLB,, | Performed by: NURSE PRACTITIONER

## 2022-03-06 PROCEDURE — 1159F PR MEDICATION LIST DOCUMENTED IN MEDICAL RECORD: ICD-10-PCS | Mod: CPTII,S$GLB,, | Performed by: NURSE PRACTITIONER

## 2022-03-06 PROCEDURE — 3080F DIAST BP >= 90 MM HG: CPT | Mod: CPTII,S$GLB,, | Performed by: NURSE PRACTITIONER

## 2022-03-06 PROCEDURE — 99213 OFFICE O/P EST LOW 20 MIN: CPT | Mod: S$GLB,,, | Performed by: NURSE PRACTITIONER

## 2022-03-06 PROCEDURE — 99213 PR OFFICE/OUTPT VISIT, EST, LEVL III, 20-29 MIN: ICD-10-PCS | Mod: S$GLB,,, | Performed by: NURSE PRACTITIONER

## 2022-03-06 PROCEDURE — 4010F PR ACE/ARB THEARPY RXD/TAKEN: ICD-10-PCS | Mod: CPTII,S$GLB,, | Performed by: NURSE PRACTITIONER

## 2022-03-06 PROCEDURE — 3077F PR MOST RECENT SYSTOLIC BLOOD PRESSURE >= 140 MM HG: ICD-10-PCS | Mod: CPTII,S$GLB,, | Performed by: NURSE PRACTITIONER

## 2022-03-06 PROCEDURE — 3008F PR BODY MASS INDEX (BMI) DOCUMENTED: ICD-10-PCS | Mod: CPTII,S$GLB,, | Performed by: NURSE PRACTITIONER

## 2022-03-06 PROCEDURE — 3077F SYST BP >= 140 MM HG: CPT | Mod: CPTII,S$GLB,, | Performed by: NURSE PRACTITIONER

## 2022-03-06 PROCEDURE — 1159F MED LIST DOCD IN RCRD: CPT | Mod: CPTII,S$GLB,, | Performed by: NURSE PRACTITIONER

## 2022-03-06 RX ORDER — ALBUTEROL SULFATE 90 UG/1
2 AEROSOL, METERED RESPIRATORY (INHALATION) EVERY 4 HOURS PRN
COMMUNITY
Start: 2022-02-16 | End: 2022-03-06

## 2022-03-06 RX ORDER — FLUTICASONE PROPIONATE 50 MCG
2 SPRAY, SUSPENSION (ML) NASAL DAILY
Qty: 15.8 ML | Refills: 0 | Status: SHIPPED | OUTPATIENT
Start: 2022-03-06 | End: 2022-03-13

## 2022-03-06 RX ORDER — ALBUTEROL SULFATE 0.83 MG/ML
SOLUTION RESPIRATORY (INHALATION)
COMMUNITY
Start: 2022-02-16 | End: 2022-03-06

## 2022-03-06 RX ORDER — EPINEPHRINE 0.3 MG/.3ML
1 INJECTION SUBCUTANEOUS ONCE
Qty: 0.3 ML | Refills: 0 | Status: SHIPPED | OUTPATIENT
Start: 2022-03-06 | End: 2022-03-06

## 2022-03-06 RX ORDER — ALBUTEROL SULFATE 0.83 MG/ML
2.5 SOLUTION RESPIRATORY (INHALATION)
COMMUNITY
Start: 2022-02-16 | End: 2022-03-18

## 2022-03-06 RX ORDER — DIPHENHYDRAMINE HCL 25 MG
50 TABLET ORAL
Status: COMPLETED | OUTPATIENT
Start: 2022-03-06 | End: 2022-03-06

## 2022-03-06 RX ORDER — ALBUTEROL SULFATE 90 UG/1
2 AEROSOL, METERED RESPIRATORY (INHALATION) EVERY 4 HOURS PRN
COMMUNITY
Start: 2022-02-16 | End: 2023-02-16

## 2022-03-06 RX ORDER — HYDROXYZINE HYDROCHLORIDE 25 MG/1
25 TABLET, FILM COATED ORAL
Status: DISCONTINUED | OUTPATIENT
Start: 2022-03-06 | End: 2022-03-06

## 2022-03-06 RX ADMIN — Medication 50 MG: at 01:03

## 2022-03-06 NOTE — PROGRESS NOTES
"Subjective:       Patient ID: Lucrecia Foster is a 72 y.o. female.    Vitals:  height is 5' 5" (1.651 m) and weight is 90.3 kg (199 lb). Her oral temperature is 97.5 °F (36.4 °C). Her blood pressure is 165/90 (abnormal) and her pulse is 64. Her respiration is 16 and oxygen saturation is 96%.     Chief Complaint: Eye Pain and Cough    Patient c/o bilateral eye swelling, eye redness, itching, burning.  Also c/o rash around her lips.  This occurred a couple of weeks ago and patient was treated for hardeep orbital cellulitis. She completed a course of augmentin, symptoms seemed to improve but not completely resolve. Photo taken during that visit compared to physical assessment today shows similar presentation.  Denies new cosmetics, soaps, detergents, medications. She is allergic to nickel but denies known exposure. Also c/o coughing, scratchy throat.     Eye Pain   Both eyes are affected.This is a new problem. The current episode started in the past 7 days. The problem occurs constantly. The problem has been unchanged. The pain is at a severity of 10/10. The pain is severe. Associated symptoms include eye redness and itching. Pertinent negatives include no blurred vision, eye discharge, fever, nausea or vomiting. She has tried nothing for the symptoms. The treatment provided no relief.   Cough  Associated symptoms include eye redness. Pertinent negatives include no chest pain, chills, ear pain, fever, headaches, myalgias, rash, sore throat, shortness of breath or wheezing.       Constitution: Negative for chills, sweating, fatigue and fever.   HENT: Negative for ear pain, ear discharge, tinnitus, hearing loss, congestion, sinus pain, sinus pressure, sore throat, trouble swallowing and voice change.    Neck: Negative for neck pain and painful lymph nodes.   Cardiovascular: Negative for chest pain, palpitations and sob on exertion.   Eyes: Positive for eye itching, eye pain and eye redness. Negative for eye discharge and " blurred vision.   Respiratory: Negative for chest tightness, cough, sputum production, shortness of breath, stridor and wheezing.    Gastrointestinal: Negative for abdominal pain, nausea, vomiting and diarrhea.   Musculoskeletal: Negative for muscle ache.   Skin: Negative for color change, pale and rash.   Allergic/Immunologic: Negative for sneezing.   Neurological: Negative for headaches, numbness and tingling.   Hematologic/Lymphatic: Negative for swollen lymph nodes.       Objective:      Physical Exam   Constitutional: She is oriented to person, place, and time. She appears well-developed. She is cooperative.  Non-toxic appearance. She does not appear ill. No distress.   HENT:   Head: Normocephalic and atraumatic.   Ears:   Right Ear: Hearing, tympanic membrane, external ear and ear canal normal.   Left Ear: Hearing, tympanic membrane, external ear and ear canal normal.   Nose: Nose normal. No mucosal edema, rhinorrhea, nasal deformity or congestion. No epistaxis. Right sinus exhibits no maxillary sinus tenderness and no frontal sinus tenderness. Left sinus exhibits no maxillary sinus tenderness and no frontal sinus tenderness.   Mouth/Throat: Uvula is midline, oropharynx is clear and moist and mucous membranes are normal. No trismus in the jaw. Normal dentition. No uvula swelling. No oropharyngeal exudate, posterior oropharyngeal edema or posterior oropharyngeal erythema.   Eyes: Conjunctivae are normal. Pupils are equal, round, and reactive to light. Right eye exhibits no chemosis, no discharge, no exudate and no hordeolum. No foreign body present in the right eye. Left eye exhibits no chemosis, no discharge, no exudate and no hordeolum. No foreign body present in the left eye. Right conjunctiva is not injected. Right conjunctiva has no hemorrhage. Left conjunctiva is not injected. Left conjunctiva has no hemorrhage. No scleral icterus.      extraocular movement intact      Comments: Generalized bilateral  periorbital swelling.    Neck: Trachea normal and phonation normal. Neck supple. No edema present. No erythema present. No neck rigidity present.   Cardiovascular: Normal rate.   Pulmonary/Chest: Effort normal. No respiratory distress. She has no decreased breath sounds.   Abdominal: Normal appearance.   Musculoskeletal: Normal range of motion.         General: No deformity. Normal range of motion.   Lymphadenopathy:     She has no cervical adenopathy.   Neurological: She is alert and oriented to person, place, and time. She exhibits normal muscle tone. Coordination normal.   Skin: Skin is warm, dry, intact, not diaphoretic and not pale.   Psychiatric: Her speech is normal and behavior is normal. Judgment and thought content normal.   Nursing note and vitals reviewed.        Assessment:       1. Angioedema, initial encounter    2. Rhinitis, unspecified type          Plan:         Angioedema, initial encounter  -     Discontinue: hydrOXYzine HCL tablet 25 mg  -     Ambulatory referral/consult to Allergy  -     EPINEPHrine (EPIPEN) 0.3 mg/0.3 mL AtIn; Inject 0.3 mLs (0.3 mg total) into the muscle once. for 1 dose  Dispense: 0.3 mL; Refill: 0  -     diphenhydrAMINE tablet 50 mg  -patient may benefit for corticosteroids, however, she states her home blood sugars have been high. Advised to f/u with PCP for continued follow up and management.  Instructed on epi pen usage.     Rhinitis, unspecified type  -     fluticasone propionate (FLONASE) 50 mcg/actuation nasal spray; 2 sprays (100 mcg total) by Each Nostril route once daily. for 7 days  Dispense: 15.8 mL; Refill: 0    After taking into careful account the patient's history, physical exam findings, as well as empirical and objective data obtained throughout urgent care workup, I feel no emergent medical condition has been identified. No further evaluation or admission was felt to be required, and the patient is stable for discharge from the . The patient and any  additional family present were updated with test results, overall clinical impression, and recommended further plan of care, including discharge instructions as provided and outpatient follow-up for continued evaluation and management as needed. All questions were answered. The patient expressed understanding and agreed with current plan for discharge and follow-up plan of care. Strict ED precautions were provided, including return/worsening of current symptoms, new symptoms, or any other concerns.  Advised on return/follow-up precautions. Advised on ER precautions. Answered all patient questions. Patient verbalized understanding and voiced agreement with current treatment plan.

## 2022-03-10 ENCOUNTER — OFFICE VISIT (OUTPATIENT)
Dept: ALLERGY | Facility: CLINIC | Age: 72
End: 2022-03-10
Payer: MEDICARE

## 2022-03-10 VITALS — HEIGHT: 64 IN | WEIGHT: 199.75 LBS | BODY MASS INDEX: 34.1 KG/M2

## 2022-03-10 DIAGNOSIS — R21 RASH: Primary | ICD-10-CM

## 2022-03-10 DIAGNOSIS — R06.2 WHEEZING: ICD-10-CM

## 2022-03-10 DIAGNOSIS — H10.45 OTHER CHRONIC ALLERGIC CONJUNCTIVITIS OF BOTH EYES: ICD-10-CM

## 2022-03-10 PROCEDURE — 4010F PR ACE/ARB THEARPY RXD/TAKEN: ICD-10-PCS | Mod: CPTII,S$GLB,, | Performed by: STUDENT IN AN ORGANIZED HEALTH CARE EDUCATION/TRAINING PROGRAM

## 2022-03-10 PROCEDURE — 99999 PR PBB SHADOW E&M-EST. PATIENT-LVL III: CPT | Mod: PBBFAC,,, | Performed by: STUDENT IN AN ORGANIZED HEALTH CARE EDUCATION/TRAINING PROGRAM

## 2022-03-10 PROCEDURE — 4010F ACE/ARB THERAPY RXD/TAKEN: CPT | Mod: CPTII,S$GLB,, | Performed by: STUDENT IN AN ORGANIZED HEALTH CARE EDUCATION/TRAINING PROGRAM

## 2022-03-10 PROCEDURE — 1125F PR PAIN SEVERITY QUANTIFIED, PAIN PRESENT: ICD-10-PCS | Mod: CPTII,S$GLB,, | Performed by: STUDENT IN AN ORGANIZED HEALTH CARE EDUCATION/TRAINING PROGRAM

## 2022-03-10 PROCEDURE — 1159F PR MEDICATION LIST DOCUMENTED IN MEDICAL RECORD: ICD-10-PCS | Mod: CPTII,S$GLB,, | Performed by: STUDENT IN AN ORGANIZED HEALTH CARE EDUCATION/TRAINING PROGRAM

## 2022-03-10 PROCEDURE — 99999 PR PBB SHADOW E&M-EST. PATIENT-LVL III: ICD-10-PCS | Mod: PBBFAC,,, | Performed by: STUDENT IN AN ORGANIZED HEALTH CARE EDUCATION/TRAINING PROGRAM

## 2022-03-10 PROCEDURE — 3008F BODY MASS INDEX DOCD: CPT | Mod: CPTII,S$GLB,, | Performed by: STUDENT IN AN ORGANIZED HEALTH CARE EDUCATION/TRAINING PROGRAM

## 2022-03-10 PROCEDURE — 1125F AMNT PAIN NOTED PAIN PRSNT: CPT | Mod: CPTII,S$GLB,, | Performed by: STUDENT IN AN ORGANIZED HEALTH CARE EDUCATION/TRAINING PROGRAM

## 2022-03-10 PROCEDURE — 99204 PR OFFICE/OUTPT VISIT, NEW, LEVL IV, 45-59 MIN: ICD-10-PCS | Mod: S$GLB,,, | Performed by: STUDENT IN AN ORGANIZED HEALTH CARE EDUCATION/TRAINING PROGRAM

## 2022-03-10 PROCEDURE — 3008F PR BODY MASS INDEX (BMI) DOCUMENTED: ICD-10-PCS | Mod: CPTII,S$GLB,, | Performed by: STUDENT IN AN ORGANIZED HEALTH CARE EDUCATION/TRAINING PROGRAM

## 2022-03-10 PROCEDURE — 1159F MED LIST DOCD IN RCRD: CPT | Mod: CPTII,S$GLB,, | Performed by: STUDENT IN AN ORGANIZED HEALTH CARE EDUCATION/TRAINING PROGRAM

## 2022-03-10 PROCEDURE — 99204 OFFICE O/P NEW MOD 45 MIN: CPT | Mod: S$GLB,,, | Performed by: STUDENT IN AN ORGANIZED HEALTH CARE EDUCATION/TRAINING PROGRAM

## 2022-03-10 RX ORDER — PREDNISONE 20 MG/1
20 TABLET ORAL DAILY
Qty: 6 TABLET | Refills: 0 | Status: SHIPPED | OUTPATIENT
Start: 2022-03-10 | End: 2022-03-16

## 2022-03-10 RX ORDER — CETIRIZINE HYDROCHLORIDE 10 MG/1
10 TABLET ORAL DAILY
Qty: 30 TABLET | Refills: 1 | Status: SHIPPED | OUTPATIENT
Start: 2022-03-10 | End: 2022-03-23

## 2022-03-10 RX ORDER — DIPHENHYDRAMINE HCL 25 MG
25 CAPSULE ORAL NIGHTLY PRN
COMMUNITY

## 2022-03-10 NOTE — PROGRESS NOTES
ALLERGY & IMMUNOLOGY CLINIC - INITIAL CONSULTATION      HISTORY OF PRESENT ILLNESS     Patient ID: Lucrecia Foster is a 72 y.o. female    CC: facial swelling, rash, and pruritus x a month    HPI: Lucrecia Foster is a 72 y.o. female presenting for facial swelling, rash, and pruritus that started in early Feb.  She was referred by Amanda Levin NP (urgent care).    First happened in early Feb. She was diagnoses with periorbital cellulitis. Saw ophthalmology. Given eye drops and doxycycline. She does not think it helped. It went away for a few days, but came back while she was on the medications. She will sometimes wake up with the swelling around her eyes and it will last throughout the day. Some days the swelling is down. Its intensely pruritic. Currently with less swelling. Skin looks hyperpigmented and dry around eyes, around mouth, and on chin. The bottom lip can get swollen. She says its not a bumpy rash, but it can be scaly. No blisters. Nothing like this has ever happened before. She saw outside derm, diagnosed with eczema. She was told to use aquaphor around her eyes and alclometasone diproprionate 0.5% ointment on the lower half of face. She says it has not helped at all. She has not tried oral steroids for it. She does have DM. She checks her blood sugars. She was using dial soap. Switched to dove, and baby shampoo for the eyes (since the rash). She has been using the same shampoo for 20 years. She was using a facial moisturizer that she has since stopped without improvement. She was using vaseline, but it burned. She said the rash was also on her chest and shoulder, but this seems to have resolved there. She has tried taking benadryl at night, but it doesn't help. The pruritus is affecting her sleep.    She says she has been wheezing for about 3 weeks now. She says she follows with a pulmonologist at [facility]. She does have a history of asthma. She has albuterol. She has not needed the albuterol in  months. She has not used it recently either because she has not had any shortness of breath with the wheezing. She says she does not really think she has asthma. She reports she has a CT chest scheduled for tomorrow. She says she had PFT's done last week, but she doesn't know the results.    She says she has flonase. She says its for asthma and she doesn't have rhinitis symptoms.    She says the whole face is itching. She does not think the eyes themselves are itching, but she cant tell. She says her whole face hurts. She says its a stinging pain.    Vaccines:   Immunization History   Administered Date(s) Administered    COVID-19, MRNA, LN-S, PF (Pfizer) (Purple Cap) 11/19/2021        REVIEW OF SYSTEMS     CONST: no F/C, + night sweats in Feb but not as much recently, no unexplained weight changes  EYES: no vision changes, + discharge sometimes stringy and clear sometimes thick and white.  NOSE: no congestion, no rhinorrhea  PULM: no SOB, + wheezing, no cough  GI: no pain, no N/V/D, no BRBPR/melena  H/O: no easy bruising or bleeding  DERM: + rash     MEDICAL HISTORY     MedHx:   Patient Active Problem List   Diagnosis    Myalgia and myositis, unspecified    Essential hypertension    Obesity       SurgHx:   Past Surgical History:   Procedure Laterality Date    BACK SURGERY      for sciatic nerver [id number]    CATARACT EXTRACTION      HIP SURGERY      TUBAL LIGATION         Medications:   Current Outpatient Medications on File Prior to Visit   Medication Sig Dispense Refill    albuterol (PROVENTIL) 2.5 mg /3 mL (0.083 %) nebulizer solution Inhale 2.5 mg into the lungs.      albuterol (PROVENTIL/VENTOLIN HFA) 90 mcg/actuation inhaler Inhale 2 puffs into the lungs every 4 (four) hours as needed.      alclomethasone (ACLOVATE) 0.05 % ointment 3 (three) times daily.      amLODIPine (NORVASC) 10 MG tablet Take 10 mg by mouth.      atorvastatin (LIPITOR) 40 MG tablet Take 40 mg by mouth once daily.      atropine  1% (ISOPTO ATROPINE) 1 % Drop LOCATION  LEFT EYE. INSTILL ONE DROP IN LEFT EYE 2X A DAY.      diphenhydrAMINE (BENADRYL) 25 mg capsule Take 25 mg by mouth nightly as needed for Itching.      dorzolamide (TRUSOPT) 2 % ophthalmic solution Place into both eyes.      dorzolamide-timolol 2-0.5% (COSOPT) 22.3-6.8 mg/mL ophthalmic solution INSTILL 1 DROP IN LEFT EYE TWICE A DAY      empagliflozin-linagliptin (GLYXAMBI) 10-5 mg Tab Take by mouth.      ergocalciferol (ERGOCALCIFEROL) 50,000 unit Cap Take 50,000 Units by mouth every 7 days.      fluticasone propionate (FLONASE) 50 mcg/actuation nasal spray 2 sprays (100 mcg total) by Each Nostril route once daily. for 7 days 15.8 mL 0    hydroCHLOROthiazide (HYDRODIURIL) 25 MG tablet Take 25 mg by mouth once daily.      ILEVRO 0.3 % DrpS LOCATION  LEFT EYE. INSTILL ONE TO TWO DROPS INTO THE LEFT EYE 1X A DAY FOR 30 DAYS.      lubiprostone (AMITIZA) 24 MCG Cap Take 24 mcg by mouth 2 (two) times daily with meals.      valsartan (DIOVAN) 80 MG tablet Take 80 mg by mouth once daily.      benzonatate (TESSALON PERLES) 100 MG capsule Take 2 capsules (200 mg total) by mouth every 6 (six) hours as needed for Cough. (Patient not taking: Reported on 3/10/2022) 30 capsule 1    EPINEPHrine (EPIPEN) 0.3 mg/0.3 mL AtIn Inject 0.3 mLs (0.3 mg total) into the muscle once. for 1 dose 0.3 mL 0    furosemide (LASIX) 20 MG tablet Take 20 mg by mouth once daily.      gabapentin (NEURONTIN) 300 MG capsule Take 1 capsule (300 mg total) by mouth 3 (three) times daily. for 7 days 21 capsule 0    glimepiride (AMARYL) 1 MG tablet Take 1 mg by mouth.      ketoconazole (NIZORAL) 2 % cream Apply topically 2 (two) times daily. for 10 days 60 g 0    lisinopril (PRINIVIL,ZESTRIL) 20 MG tablet Take 20 mg by mouth once daily.      losartan (COZAAR) 25 MG tablet Take 25 mg by mouth once daily.      LOTEMAX SM 0.38 % DrpG LOCATION  LEFT EYE. INSTILL ONE DROP INTO BOTH EYES 4X A DAY.       "methocarbamoL (ROBAXIN) 500 MG Tab Take 500 mg by mouth 3 (three) times daily as needed.      nitrofurantoin, macrocrystal-monohydrate, (MACROBID) 100 MG capsule Take 100 mg by mouth 2 (two) times daily.      omeprazole (PRILOSEC) 20 MG capsule Take 20 mg by mouth once daily.      prednisoLONE acetate (PRED FORTE) 1 % DrpS Place into both eyes.      simvastatin (ZOCOR) 10 MG tablet Take 10 mg by mouth every evening.      triamcinolone acetonide 0.1% (KENALOG) 0.1 % cream Apply topically 3 (three) times daily. for 7 days 15 g 0     No current facility-administered medications on file prior to visit.       H/o Asthma: endorses  H/o Eczema: endorses. Has had it on and off since childhood  H/o Rhinitis: denies  Food Allergy: denies    Drug Allergies:   Review of patient's allergies indicates:   Allergen Reactions    Darvocet a500 [propoxyphene n-acetaminophen] Nausea And Vomiting    Nickel Rash    Nickel sutures [surgical stainless steel]     Vicodin [hydrocodone-acetaminophen] Nausea And Vomiting    Unable to assess Rash     Pt is allergic to nickel.        Env/Occ:   Pets: no    Infection Hx: Denies frequent infections requiring antibiotics. No history of pneumonia.    SocHx:   Social History     Tobacco Use    Smoking status: Never Smoker    Smokeless tobacco: Never Used   Substance Use Topics    Alcohol use: Never    Drug use: No       FamHx:   Asthma: no  Allergic rhinitis: no  Food Allergy: no  Family History   Problem Relation Age of Onset    Cancer Mother     Diabetes Father     Cancer Father     Allergic rhinitis Neg Hx     Allergies Neg Hx     Angioedema Neg Hx     Asthma Neg Hx     Atopy Neg Hx     Eczema Neg Hx     Immunodeficiency Neg Hx     Rhinitis Neg Hx     Urticaria Neg Hx         PHYSICAL EXAM     VS: Ht 5' 4" (1.626 m)   Wt 90.6 kg (199 lb 11.8 oz)   BMI 34.28 kg/m²   GENERAL: Alert, NAD, well-appearing, cooperative  EYES: EOMI, + conjunctival injection, no discharge, no " infraorbital shiners  EARS: external auditory canals normal B/L, TM normal B/L  NOSE: NT 2+ B/L, no stringing mucous, no polyps visualized  ORAL: MMM, no ulcers, no thrush, cannot visualize posterior oropharynx  NECK: no thyromegaly, no LAD  LUNGS: CTAB, no w/r/c, no increased WOB  HEART: RRR, normal S1/S2, no m/g/r  ABDOMEN: BS present, soft, non-tender, non-distended  EXTREMITIES: No LE edema  DERM: Skin around eyes, around mout, and on chin appears hyperpigmented, dry, and flaky  NEURO: normal speech, normal gait, no facial asymmetry     LABORATORY STUDIES     4/15/21:  BMP- within acceptable ranges  CBC/diff- within acceptable ranges (eos 200)     ALLERGEN TESTING     Immunocaps: ordered aeroallergen panel + additional trees     IMAGING & OTHER DIAGNOSTICS     CXR 3/25/19:  FINDINGS:  Linear left lower lobe scarring stable.The lungs are clear, with normal appearance of pulmonary vasculature and no pleural effusion or pneumothorax.  The cardiac silhouette is normal in size. The hilar and mediastinal contours are unremarkable.  The osseous and soft tissue structures are normal.  IMPRESSION:   Normal exam.     CHART REVIEW     Reviewed urgent care note, labs, imaging.     ASSESSMENT & PLAN     Lucrecia Foster is a 72 y.o. female with     # Facial swelling, rash, and pruritus x a month: Started in early Feb. The swelling around eyes goes up and down. Intensely pruritic, and affecting her sleep. Currently, skin around mouth appears hyperpigmented and dry. She has tried antibiotics from ophtho when it was thought to be periorbital cellulitis. She has tried topical steroid from derm (alclometasone diproprionate 0.5% ointment). I am unsure of the etiology. It is not typical appearance for a contact dermatitis, and patient has not identified any possible triggers. Allergic conjunctivitis and atopic dermatitis is a possibility given that we are now in tree pollen season, but she has not been suffering from rhinitis  symptoms. The rash is not consistent with spontaneous angioedema.  -trial high dose antihistamines (cetirizine 20 mg BID)  -if this isnt helpful, start prednisone 20 mg x6 days  -CBC/diff and CMP ordered  -immunocaps for aeroallergens + additional trees ordered  -will follow up in 2 weeks    # Wheezing x3 weeks: Patient says she has a history of asthma, but she does not think she really has asthma. She has not been using her albuterol inhaler, because she has not been short of breath.  -suggested she try the albuterol inhaler for the wheezing  -she has CT chest scheduled for tomorrow at [facility]  -she had PFT's done 2/25/22 at [facility] but I cannot see results  -continue to follow with pulm at [facility]    Follow up: 2 weeks      Bentley Cabrales MD  Allergy/Immunology

## 2022-03-10 NOTE — PATIENT INSTRUCTIONS
For the swelling and itching of your face:   I recommend starting with a trial of high dose antihistamines like zyrtec (cetirizine) or claritin (loratadine)  2 tablets twice daily. You can adjust to the lowest dose that controls your symptoms. Cetirizine is likely the most effective, but if you find it sedating, you could try loratadine. I have prescribed the cetirizine, but you can take higher doses than it says on the bottle (up to 4 tablets per day).     If that isn't helping at all after a few days to a week. You can try the course of prednisone that I have prescribed. You will need to monitor your blood sugars closely while on it and stop the prednisone if your blood sugars go too high.

## 2022-03-14 ENCOUNTER — LAB VISIT (OUTPATIENT)
Dept: LAB | Facility: OTHER | Age: 72
End: 2022-03-14
Payer: MEDICARE

## 2022-03-14 DIAGNOSIS — R21 RASH: ICD-10-CM

## 2022-03-14 DIAGNOSIS — R06.2 WHEEZING: ICD-10-CM

## 2022-03-14 DIAGNOSIS — H10.45 OTHER CHRONIC ALLERGIC CONJUNCTIVITIS OF BOTH EYES: ICD-10-CM

## 2022-03-14 LAB
ALBUMIN SERPL BCP-MCNC: 4 G/DL (ref 3.5–5.2)
ALP SERPL-CCNC: 122 U/L (ref 55–135)
ALT SERPL W/O P-5'-P-CCNC: 8 U/L (ref 10–44)
ANION GAP SERPL CALC-SCNC: 10 MMOL/L (ref 8–16)
AST SERPL-CCNC: 18 U/L (ref 10–40)
BASOPHILS # BLD AUTO: 0.03 K/UL (ref 0–0.2)
BASOPHILS NFR BLD: 0.3 % (ref 0–1.9)
BILIRUB SERPL-MCNC: 0.6 MG/DL (ref 0.1–1)
BUN SERPL-MCNC: 15 MG/DL (ref 8–23)
CALCIUM SERPL-MCNC: 9.7 MG/DL (ref 8.7–10.5)
CHLORIDE SERPL-SCNC: 107 MMOL/L (ref 95–110)
CO2 SERPL-SCNC: 24 MMOL/L (ref 23–29)
CREAT SERPL-MCNC: 1.1 MG/DL (ref 0.5–1.4)
DIFFERENTIAL METHOD: ABNORMAL
EOSINOPHIL # BLD AUTO: 0.4 K/UL (ref 0–0.5)
EOSINOPHIL NFR BLD: 4.5 % (ref 0–8)
ERYTHROCYTE [DISTWIDTH] IN BLOOD BY AUTOMATED COUNT: 14.1 % (ref 11.5–14.5)
EST. GFR  (AFRICAN AMERICAN): 57 ML/MIN/1.73 M^2
EST. GFR  (NON AFRICAN AMERICAN): 50 ML/MIN/1.73 M^2
GLUCOSE SERPL-MCNC: 114 MG/DL (ref 70–110)
HCT VFR BLD AUTO: 51 % (ref 37–48.5)
HGB BLD-MCNC: 16 G/DL (ref 12–16)
IMM GRANULOCYTES # BLD AUTO: 0.06 K/UL (ref 0–0.04)
IMM GRANULOCYTES NFR BLD AUTO: 0.6 % (ref 0–0.5)
LYMPHOCYTES # BLD AUTO: 2.5 K/UL (ref 1–4.8)
LYMPHOCYTES NFR BLD: 25.7 % (ref 18–48)
MCH RBC QN AUTO: 27.4 PG (ref 27–31)
MCHC RBC AUTO-ENTMCNC: 31.4 G/DL (ref 32–36)
MCV RBC AUTO: 88 FL (ref 82–98)
MONOCYTES # BLD AUTO: 0.7 K/UL (ref 0.3–1)
MONOCYTES NFR BLD: 7.5 % (ref 4–15)
NEUTROPHILS # BLD AUTO: 5.9 K/UL (ref 1.8–7.7)
NEUTROPHILS NFR BLD: 61.4 % (ref 38–73)
NRBC BLD-RTO: 0 /100 WBC
PLATELET # BLD AUTO: 177 K/UL (ref 150–450)
PMV BLD AUTO: 12 FL (ref 9.2–12.9)
POTASSIUM SERPL-SCNC: 4.3 MMOL/L (ref 3.5–5.1)
PROT SERPL-MCNC: 7.9 G/DL (ref 6–8.4)
RBC # BLD AUTO: 5.83 M/UL (ref 4–5.4)
SODIUM SERPL-SCNC: 141 MMOL/L (ref 136–145)
WBC # BLD AUTO: 9.65 K/UL (ref 3.9–12.7)

## 2022-03-14 PROCEDURE — 85025 COMPLETE CBC W/AUTO DIFF WBC: CPT | Performed by: STUDENT IN AN ORGANIZED HEALTH CARE EDUCATION/TRAINING PROGRAM

## 2022-03-14 PROCEDURE — 36415 COLL VENOUS BLD VENIPUNCTURE: CPT | Performed by: STUDENT IN AN ORGANIZED HEALTH CARE EDUCATION/TRAINING PROGRAM

## 2022-03-14 PROCEDURE — 86003 ALLG SPEC IGE CRUDE XTRC EA: CPT | Performed by: STUDENT IN AN ORGANIZED HEALTH CARE EDUCATION/TRAINING PROGRAM

## 2022-03-14 PROCEDURE — 80053 COMPREHEN METABOLIC PANEL: CPT | Performed by: STUDENT IN AN ORGANIZED HEALTH CARE EDUCATION/TRAINING PROGRAM

## 2022-03-14 PROCEDURE — 86003 ALLG SPEC IGE CRUDE XTRC EA: CPT | Mod: 59 | Performed by: STUDENT IN AN ORGANIZED HEALTH CARE EDUCATION/TRAINING PROGRAM

## 2022-03-16 LAB — AMER SYCAMORE IGE QN: <0.35 KU/L

## 2022-03-17 LAB
A ALTERNATA IGE QN: <0.1 KU/L
A FUMIGATUS IGE QN: <0.1 KU/L
ALLERGEN MULBERRY CLASS: NORMAL
ALLERGEN MULBERRY TREE IGE: <0.1 KU/L
ALLERGEN WHITE ASH TREE IGE: <0.1 KU/L
BERMUDA GRASS IGE QN: <0.1 KU/L
CAT DANDER IGE QN: <0.1 KU/L
CEDAR IGE QN: <0.1 KU/L
D FARINAE IGE QN: 17.8 KU/L
D PTERONYSS IGE QN: 17.7 KU/L
DEPRECATED A ALTERNATA IGE RAST QL: NORMAL
DEPRECATED A FUMIGATUS IGE RAST QL: NORMAL
DEPRECATED BERMUDA GRASS IGE RAST QL: NORMAL
DEPRECATED CAT DANDER IGE RAST QL: NORMAL
DEPRECATED CEDAR IGE RAST QL: NORMAL
DEPRECATED D FARINAE IGE RAST QL: ABNORMAL
DEPRECATED D PTERONYSS IGE RAST QL: ABNORMAL
DEPRECATED DOG DANDER IGE RAST QL: NORMAL
DEPRECATED ENGL PLANTAIN IGE RAST QL: NORMAL
DEPRECATED HACKBERRY TREE IGE RAST QL: NORMAL
DEPRECATED PECAN/HICK TREE IGE RAST QL: NORMAL
DEPRECATED SILVER BIRCH IGE RAST QL: NORMAL
DEPRECATED TIMOTHY IGE RAST QL: ABNORMAL
DEPRECATED WEST RAGWEED IGE RAST QL: NORMAL
DEPRECATED WHITE OAK IGE RAST QL: NORMAL
DEPRECATED WILLOW IGE RAST QL: NORMAL
DOG DANDER IGE QN: <0.1 KU/L
ENGL PLANTAIN IGE QN: <0.1 KU/L
HACKBERRY TREE IGE QN: <0.1 KU/L
PECAN/HICK TREE IGE QN: <0.1 KU/L
SILVER BIRCH IGE QN: <0.1 KU/L
TIMOTHY IGE QN: 0.26 KU/L
WEST RAGWEED IGE QN: <0.1 KU/L
WHITE ASH CLASS: NORMAL
WHITE OAK IGE QN: <0.1 KU/L
WILLOW IGE QN: <0.1 KU/L

## 2022-03-18 LAB
DEPRECATED SWEET GUM IGE RAST QL: NORMAL
SWEET GUM IGE QN: <0.1 KU/L

## 2022-03-23 ENCOUNTER — OFFICE VISIT (OUTPATIENT)
Dept: ALLERGY | Facility: CLINIC | Age: 72
End: 2022-03-23
Payer: MEDICARE

## 2022-03-23 VITALS — WEIGHT: 198.63 LBS | HEIGHT: 64 IN | BODY MASS INDEX: 33.91 KG/M2

## 2022-03-23 DIAGNOSIS — R21 RASH: Primary | ICD-10-CM

## 2022-03-23 PROCEDURE — 1126F AMNT PAIN NOTED NONE PRSNT: CPT | Mod: CPTII,S$GLB,, | Performed by: STUDENT IN AN ORGANIZED HEALTH CARE EDUCATION/TRAINING PROGRAM

## 2022-03-23 PROCEDURE — 99213 PR OFFICE/OUTPT VISIT, EST, LEVL III, 20-29 MIN: ICD-10-PCS | Mod: S$GLB,,, | Performed by: STUDENT IN AN ORGANIZED HEALTH CARE EDUCATION/TRAINING PROGRAM

## 2022-03-23 PROCEDURE — 3008F PR BODY MASS INDEX (BMI) DOCUMENTED: ICD-10-PCS | Mod: CPTII,S$GLB,, | Performed by: STUDENT IN AN ORGANIZED HEALTH CARE EDUCATION/TRAINING PROGRAM

## 2022-03-23 PROCEDURE — 99999 PR PBB SHADOW E&M-EST. PATIENT-LVL III: CPT | Mod: PBBFAC,,, | Performed by: STUDENT IN AN ORGANIZED HEALTH CARE EDUCATION/TRAINING PROGRAM

## 2022-03-23 PROCEDURE — 1159F MED LIST DOCD IN RCRD: CPT | Mod: CPTII,S$GLB,, | Performed by: STUDENT IN AN ORGANIZED HEALTH CARE EDUCATION/TRAINING PROGRAM

## 2022-03-23 PROCEDURE — 99999 PR PBB SHADOW E&M-EST. PATIENT-LVL III: ICD-10-PCS | Mod: PBBFAC,,, | Performed by: STUDENT IN AN ORGANIZED HEALTH CARE EDUCATION/TRAINING PROGRAM

## 2022-03-23 PROCEDURE — 99213 OFFICE O/P EST LOW 20 MIN: CPT | Mod: S$GLB,,, | Performed by: STUDENT IN AN ORGANIZED HEALTH CARE EDUCATION/TRAINING PROGRAM

## 2022-03-23 PROCEDURE — 1159F PR MEDICATION LIST DOCUMENTED IN MEDICAL RECORD: ICD-10-PCS | Mod: CPTII,S$GLB,, | Performed by: STUDENT IN AN ORGANIZED HEALTH CARE EDUCATION/TRAINING PROGRAM

## 2022-03-23 PROCEDURE — 4010F ACE/ARB THERAPY RXD/TAKEN: CPT | Mod: CPTII,S$GLB,, | Performed by: STUDENT IN AN ORGANIZED HEALTH CARE EDUCATION/TRAINING PROGRAM

## 2022-03-23 PROCEDURE — 3008F BODY MASS INDEX DOCD: CPT | Mod: CPTII,S$GLB,, | Performed by: STUDENT IN AN ORGANIZED HEALTH CARE EDUCATION/TRAINING PROGRAM

## 2022-03-23 PROCEDURE — 1126F PR PAIN SEVERITY QUANTIFIED, NO PAIN PRESENT: ICD-10-PCS | Mod: CPTII,S$GLB,, | Performed by: STUDENT IN AN ORGANIZED HEALTH CARE EDUCATION/TRAINING PROGRAM

## 2022-03-23 PROCEDURE — 4010F PR ACE/ARB THEARPY RXD/TAKEN: ICD-10-PCS | Mod: CPTII,S$GLB,, | Performed by: STUDENT IN AN ORGANIZED HEALTH CARE EDUCATION/TRAINING PROGRAM

## 2022-03-23 RX ORDER — ALBUTEROL SULFATE 0.83 MG/ML
SOLUTION RESPIRATORY (INHALATION)
COMMUNITY
Start: 2022-02-16

## 2022-03-23 RX ORDER — CALCIUM CITRATE/VITAMIN D3 200MG-6.25
TABLET ORAL 2 TIMES DAILY
COMMUNITY
Start: 2022-03-16

## 2022-03-23 RX ORDER — CIPROFLOXACIN HYDROCHLORIDE 3 MG/ML
SOLUTION/ DROPS OPHTHALMIC
Status: ON HOLD | COMMUNITY
Start: 2022-03-21 | End: 2023-12-19 | Stop reason: HOSPADM

## 2022-03-23 NOTE — PROGRESS NOTES
ALLERGY & IMMUNOLOGY CLINIC - FOLLOW UP      HISTORY OF PRESENT ILLNESS     Patient ID: Lucrecia Foster is a 72 y.o. female    CC: facial swelling, rash, and pruritus x a month, now improved    HPI: Lucrecia Foster is a 72 y.o. female following up for facial swelling, rash, and pruritus that started in early Feb and started improving after our last visit.    She said the prednisone helped a lot. She started feeling better within a few days. No worsening of symptoms since coming off the prednisone. She says she still gets itching, but its very mild, and not very often. She said its not anything she would complain about. She is using the cetirizine once per day. She is using all the same skin products as she was last visit. She is using aquaphor around her eyes and a cream on her face.    She says she went to eye doctor and got antibacterial eye drops shortly after seeing me.    Vaccines:   Immunization History   Administered Date(s) Administered    COVID-19, MRNA, LN-S, PF (Pfizer) (Purple Cap) 11/19/2021        REVIEW OF SYSTEMS     CONST: no F/C  EYES: no vision changes, no discharge  PULM: + SOB  DERM: rash resolved     MEDICAL HISTORY     MedHx:   Patient Active Problem List   Diagnosis    Myalgia and myositis, unspecified    Essential hypertension    Obesity       SurgHx:   Past Surgical History:   Procedure Laterality Date    BACK SURGERY      for sciatic nerver aug2017    CATARACT EXTRACTION      HIP SURGERY      TUBAL LIGATION         Medications:   Current Outpatient Medications on File Prior to Visit   Medication Sig Dispense Refill    albuterol (PROVENTIL/VENTOLIN HFA) 90 mcg/actuation inhaler Inhale 2 puffs into the lungs every 4 (four) hours as needed.      alclomethasone (ACLOVATE) 0.05 % ointment 3 (three) times daily.      amLODIPine (NORVASC) 10 MG tablet Take 10 mg by mouth.      atorvastatin (LIPITOR) 40 MG tablet Take 40 mg by mouth once daily.      atropine 1% (ISOPTO ATROPINE) 1  % Drop LOCATION  LEFT EYE. INSTILL ONE DROP IN LEFT EYE 2X A DAY.      cetirizine (ZYRTEC) 10 MG tablet Take 1 tablet (10 mg total) by mouth once daily. 30 tablet 1    diphenhydrAMINE (BENADRYL) 25 mg capsule Take 25 mg by mouth nightly as needed for Itching.      dorzolamide (TRUSOPT) 2 % ophthalmic solution Place into both eyes.      dorzolamide-timolol 2-0.5% (COSOPT) 22.3-6.8 mg/mL ophthalmic solution INSTILL 1 DROP IN LEFT EYE TWICE A DAY      empagliflozin-linagliptin (GLYXAMBI) 10-5 mg Tab Take by mouth.      EPINEPHrine (EPIPEN) 0.3 mg/0.3 mL AtIn Inject 0.3 mLs (0.3 mg total) into the muscle once. for 1 dose 0.3 mL 0    ergocalciferol (ERGOCALCIFEROL) 50,000 unit Cap Take 50,000 Units by mouth every 7 days.      furosemide (LASIX) 20 MG tablet Take 20 mg by mouth once daily.      gabapentin (NEURONTIN) 300 MG capsule Take 1 capsule (300 mg total) by mouth 3 (three) times daily. for 7 days 21 capsule 0    glimepiride (AMARYL) 1 MG tablet Take 1 mg by mouth.      hydroCHLOROthiazide (HYDRODIURIL) 25 MG tablet Take 25 mg by mouth once daily.      ILEVRO 0.3 % DrpS LOCATION  LEFT EYE. INSTILL ONE TO TWO DROPS INTO THE LEFT EYE 1X A DAY FOR 30 DAYS.      lisinopril (PRINIVIL,ZESTRIL) 20 MG tablet Take 20 mg by mouth once daily.      losartan (COZAAR) 25 MG tablet Take 25 mg by mouth once daily.      LOTEMAX SM 0.38 % DrpG LOCATION  LEFT EYE. INSTILL ONE DROP INTO BOTH EYES 4X A DAY.      lubiprostone (AMITIZA) 24 MCG Cap Take 24 mcg by mouth 2 (two) times daily with meals.      methocarbamoL (ROBAXIN) 500 MG Tab Take 500 mg by mouth 3 (three) times daily as needed.      omeprazole (PRILOSEC) 20 MG capsule Take 20 mg by mouth once daily.      prednisoLONE acetate (PRED FORTE) 1 % DrpS Place into both eyes.      simvastatin (ZOCOR) 10 MG tablet Take 10 mg by mouth every evening.      triamcinolone acetonide 0.1% (KENALOG) 0.1 % cream Apply topically 3 (three) times daily. for 7 days 15 g 0     valsartan (DIOVAN) 80 MG tablet Take 80 mg by mouth once daily.       No current facility-administered medications on file prior to visit.     Drug Allergies:   Review of patient's allergies indicates:   Allergen Reactions    Darvocet a500 [propoxyphene n-acetaminophen] Nausea And Vomiting    Nickel Rash    Nickel sutures [surgical stainless steel]     Vicodin [hydrocodone-acetaminophen] Nausea And Vomiting    Unable to assess Rash     Pt is allergic to nickel.      SocHx:   Social History     Tobacco Use    Smoking status: Never Smoker    Smokeless tobacco: Never Used   Substance Use Topics    Alcohol use: Never    Drug use: No     Additional History Obtained at Initial Visit:  H/o Asthma: endorses  H/o Eczema: endorses. Has had it on and off since childhood  H/o Rhinitis: denies  Food Allergy: denies  Env/Occ:   Pets: no  Infection Hx: Denies frequent infections requiring antibiotics. No history of pneumonia.  FamHx:   Asthma: no  Allergic rhinitis: no  Food Allergy: no     PHYSICAL EXAM     VS: There were no vitals taken for this visit.  GENERAL: Alert, NAD, well-appearing, cooperative  EYES: EOMI, no conjunctival injection, no discharge, no infraorbital shiners  LUNGS: CTAB, no w/r/c, no increased WOB  HEART: RRR, normal S1/S2, no m/g/r  DERM: Skin around eyes appears hyperpigmented, otherwise no rashes  NEURO: normal speech, no facial asymmetry     LABORATORY STUDIES     Component      Latest Ref Rng & Units 3/14/2022   WBC      3.90 - 12.70 K/uL 9.65   RBC      4.00 - 5.40 M/uL 5.83 (H)   Hemoglobin      12.0 - 16.0 g/dL 16.0   Hematocrit      37.0 - 48.5 % 51.0 (H)   MCV      82 - 98 fL 88   MCH      27.0 - 31.0 pg 27.4   MCHC      32.0 - 36.0 g/dL 31.4 (L)   RDW      11.5 - 14.5 % 14.1   Platelets      150 - 450 K/uL 177   MPV      9.2 - 12.9 fL 12.0   Immature Granulocytes      0.0 - 0.5 % 0.6 (H)   Gran # (ANC)      1.8 - 7.7 K/uL 5.9   Immature Grans (Abs)      0.00 - 0.04 K/uL 0.06 (H)   Lymph  #      1.0 - 4.8 K/uL 2.5   Mono #      0.3 - 1.0 K/uL 0.7   Eos #      0.0 - 0.5 K/uL 0.4   Baso #      0.00 - 0.20 K/uL 0.03   nRBC      0 /100 WBC 0   Gran %      38.0 - 73.0 % 61.4   Lymph %      18.0 - 48.0 % 25.7   Mono %      4.0 - 15.0 % 7.5   Eosinophil %      0.0 - 8.0 % 4.5   Basophil %      0.0 - 1.9 % 0.3   Differential Method       Automated   Sodium      136 - 145 mmol/L 141   Potassium      3.5 - 5.1 mmol/L 4.3   Chloride      95 - 110 mmol/L 107   CO2      23 - 29 mmol/L 24   Glucose      70 - 110 mg/dL 114 (H)   BUN      8 - 23 mg/dL 15   Creatinine      0.5 - 1.4 mg/dL 1.1   Calcium      8.7 - 10.5 mg/dL 9.7   PROTEIN TOTAL      6.0 - 8.4 g/dL 7.9   Albumin      3.5 - 5.2 g/dL 4.0   BILIRUBIN TOTAL      0.1 - 1.0 mg/dL 0.6   Alkaline Phosphatase      55 - 135 U/L 122   AST      10 - 40 U/L 18   ALT      10 - 44 U/L 8 (L)   Anion Gap      8 - 16 mmol/L 10   eGFR if African American      >60 mL/min/1.73 m:2 57 (A)   eGFR if non African American      >60 mL/min/1.73 m:2 50 (A)     3/11/22:     TSH- normal    4/15/21:     BMP- within acceptable ranges     CBC/diff- within acceptable ranges (eos 200)     ALLERGEN TESTING     Immunocaps:   Component      Latest Ref Rng & Units 3/14/2022   D. farinae      <0.10 kU/L 17.80 (H)   D. farinae Class       CLASS 4   Mite Dust Pteronyssinus IgE      <0.10 kU/L 17.70 (H)   D. pteronyssinus Class       CLASS 4   BERMUDA GRASS      <0.10 kU/L <0.10   [place] Grass Class       CLASS 0   Chad Grass      <0.10 kU/L 0.26 (H)   Chad Grass Class       CLASS 0/1   [place] IgE      <0.10 kU/L <0.10   [place] Class       CLASS 0   Plantain      <0.10 kU/L <0.10   English Plantain Class       CLASS 0   White Oak(Quercus alba) IgE      <0.10 kU/L <0.10   [place], Class       CLASS 0   Pecan Dingess Tree      <0.10 kU/L <0.10   Pecan, Class       CLASS 0   Ragweed, Western IgE      <0.10 kU/L <0.10   Ragweed, Western, Class       CLASS 0   Alternaria alternata      <0.10 kU/L  <0.10   Altern. alternata Class       CLASS 0   Aspergillus Fumigatus IgE      <0.10 kU/L <0.10   A. fumigatus Class       CLASS 0   Cat Dander      <0.10 kU/L <0.10   Cat Epithelium Class       CLASS 0   Dog Dander, IgE      <0.10 kU/L <0.10   Dog Dander Class       CLASS 0   [place]ch IgE      <0.10 kU/L <0.10   [place]ch Class       CLASS 0   WHITE HOWARD TREE      <0.10 kU/L <0.10   White Howard Class       CLASS 0   Hackberry Celtis, IgE      <0.10 kU/L <0.10   Hackberry Celtis Class       CLASS 0   Allergen Bracey IgE      <0.10 kU/L <0.10   Allergen Bracey Class       CLASS 0   Allergen Sweet Gum IgE      <0.10 kU/L <0.10   Allergen Sweet Gum Class       CLASS 0   Spencer, IgE      <0.10 kU/L <0.10   S[place]cer Class       CLASS 0   RAST Allergen for [place]      kU/L <0.35        IMAGING & OTHER DIAGNOSTICS     CXR 2/15/22 (care everywhere):  FINDINGS:   SUPPORT LINES & TUBES: None.   LUNG FIELDS: No evidence of edema, airspace disease, mass, or effusion. No evidence of pneumothorax.   HEART & MEDIASTINUM: Normal.   ARUN: Normal.   AIRWAYS: Normal.   BONES & JOINTS: No acute abnormality.   SOFT TISSUES OF THORAX & UPPER ABDOMEN: Within normal limits.   IMPRESSION:   No evidence of acute cardiopulmonary disease.     CXR 3/25/19:  FINDINGS:  Linear left lower lobe scarring stable.The lungs are clear, with normal appearance of pulmonary vasculature and no pleural effusion or pneumothorax.  The cardiac silhouette is normal in size. The hilar and mediastinal contours are unremarkable.  The osseous and soft tissue structures are normal.  IMPRESSION:   Normal exam.     CHART REVIEW     Reviewed labs, imaging.     ASSESSMENT & PLAN     Lucrecia Foster is a 72 y.o. female with     # Facial swelling, rash, and pruritus x a month, now improved: Started in early Feb. Started improving after our last visit. She thinks the prednisone helped a lot. Still with occasional mild pruritus and hyperpigmentation around  eyes, but rash and intense pruritus have resolved. Prior to the prednisone, she had tried antibiotics from ophtho when it was thought to be periorbital cellulitis, and topical steroid from derm (alclometasone diproprionate 0.5% ointment). I am still unsure of etiology, but it did improve with prednisone. Possibly contact dermatitis, but she has not switched any of her soaps, skin products, or detergents. Allergy testing was positive to dust mite, but I do not think this was contributing to symptoms.   -continue daily cetirizine     Follow up: as needed      Bentley Cabrales MD  Allergy/Immunology

## 2023-12-16 ENCOUNTER — OFFICE VISIT (OUTPATIENT)
Dept: URGENT CARE | Facility: CLINIC | Age: 73
End: 2023-12-16
Payer: MEDICARE

## 2023-12-16 VITALS
SYSTOLIC BLOOD PRESSURE: 154 MMHG | HEART RATE: 74 BPM | WEIGHT: 198 LBS | HEIGHT: 64 IN | BODY MASS INDEX: 33.8 KG/M2 | RESPIRATION RATE: 21 BRPM | DIASTOLIC BLOOD PRESSURE: 90 MMHG | OXYGEN SATURATION: 95 % | TEMPERATURE: 98 F

## 2023-12-16 DIAGNOSIS — E11.40 TYPE 2 DIABETES MELLITUS WITH DIABETIC NEUROPATHY, WITHOUT LONG-TERM CURRENT USE OF INSULIN: ICD-10-CM

## 2023-12-16 DIAGNOSIS — H57.9: Primary | ICD-10-CM

## 2023-12-16 DIAGNOSIS — H57.13 EYE PAIN, BILATERAL: ICD-10-CM

## 2023-12-16 LAB — GLUCOSE SERPL-MCNC: 105 MG/DL (ref 70–110)

## 2023-12-16 PROCEDURE — 99214 OFFICE O/P EST MOD 30 MIN: CPT | Mod: S$GLB,,,

## 2023-12-16 PROCEDURE — 99214 PR OFFICE/OUTPT VISIT, EST, LEVL IV, 30-39 MIN: ICD-10-PCS | Mod: S$GLB,,,

## 2023-12-16 PROCEDURE — 82962 GLUCOSE BLOOD TEST: CPT | Mod: S$GLB,,,

## 2023-12-16 PROCEDURE — 82962 POCT GLUCOSE, HAND-HELD DEVICE: ICD-10-PCS | Mod: S$GLB,,,

## 2023-12-16 RX ORDER — PREDNISOLONE ACETATE 10 MG/ML
1 SUSPENSION/ DROPS OPHTHALMIC 4 TIMES DAILY
Qty: 10 ML | Refills: 0 | Status: ON HOLD | OUTPATIENT
Start: 2023-12-16 | End: 2023-12-19 | Stop reason: HOSPADM

## 2023-12-16 RX ORDER — PREDNISOLONE SODIUM PHOSPHATE 10 MG/ML
1 SOLUTION/ DROPS OPHTHALMIC 4 TIMES DAILY
Status: ON HOLD | COMMUNITY
Start: 2023-09-06 | End: 2023-12-19 | Stop reason: HOSPADM

## 2023-12-16 RX ORDER — ATROPINE SULFATE 10 MG/ML
1 SOLUTION/ DROPS OPHTHALMIC 2 TIMES DAILY
Qty: 5 ML | Refills: 0 | Status: SHIPPED | OUTPATIENT
Start: 2023-12-16

## 2023-12-16 RX ORDER — FUROSEMIDE 20 MG/1
20 TABLET ORAL 2 TIMES DAILY
COMMUNITY

## 2023-12-16 RX ORDER — METHOTREXATE 2.5 MG/1
TABLET ORAL
COMMUNITY
Start: 2023-11-10

## 2023-12-16 RX ORDER — LEVOCETIRIZINE DIHYDROCHLORIDE 5 MG/1
1 TABLET, FILM COATED ORAL NIGHTLY
COMMUNITY
Start: 2023-11-10

## 2023-12-16 NOTE — PATIENT INSTRUCTIONS
Please use eye drops as prescribed.  It is important to follow up with your ophthalmologist. Please call on Monday to get appointment. I will put in referral for [facility] doctor if you would like to see an [facility] doctor.  If you develop sudden vision changes, blindness, eye pain, headache, fever, or worsening of your condition, go to the ER.

## 2023-12-16 NOTE — PROGRESS NOTES
"Subjective:      Patient ID: Lucrecia Foster is a 73 y.o. female.    Vitals:  height is 5' 4" (1.626 m) and weight is 89.8 kg (198 lb). Her temperature is 98.4 °F (36.9 °C). Her blood pressure is 154/90 (abnormal) and her pulse is 74. Her respiration is 21 (abnormal) and oxygen saturation is 95%.     Chief Complaint: Eye Problem    Pt presents with complaint of bilateral eye itching and burning x2 days.  Pt states this is an ongoing issue stating every three or so months she experiences the same symptoms.  Pt states she has no vision impairments except when driving at night.  Pt states she has been using otc visine eye drops for her dry eye.    Provider note starts below:    Patient presents to clinic today c/o bilateral eye "itching" and "stinging", left worse than right. States her symptoms onset last night and has not improved since then. She has tried over the counter visine dry eye relief drops with no improvement. She states this issue occurs about every 3 months. When this occurs, she goes to her ophthalmologist at [facility] and is prescribed eye drops that resolve her symptoms. She is not currently on any prescription eye medications. Today, patient also c/o floaters, photophobia, eye discharge, and mild headache. She denies any sudden changes in her vision but says she cannot drive at night. Per paperwork that patient has with her, her eye history includes chronic iridocyclitis of left eye, bilateral cataracts, pseudophakia of left eye, blepharitis bilaterally, trichiasis of both eyelids, dry eye syndrome of both eyes, keratitis sicca of both eyes.         Constitution: Negative for chills and fever.   HENT:  Negative for ear pain, congestion and sore throat.    Neck: Negative for neck pain and neck stiffness.   Cardiovascular:  Negative for chest pain and palpitations.   Eyes:  Positive for eye discharge, eye itching, eye redness, photophobia and eyelid swelling. Negative for eye trauma, foreign body in eye, " eye pain, vision loss, double vision and blurred vision.   Respiratory:  Negative for cough and shortness of breath.    Gastrointestinal:  Negative for nausea and vomiting.   Musculoskeletal:  Negative for muscle cramps and muscle ache.   Skin:  Negative for pale and rash.   Neurological:  Positive for headaches. Negative for dizziness, light-headedness and altered mental status.   Psychiatric/Behavioral:  Negative for altered mental status.       Objective:     Physical Exam   Constitutional: She is oriented to person, place, and time.   HENT:   Head: Normocephalic and atraumatic.   Eyes: Right eye exhibits chemosis and discharge. Right eye exhibits no exudate. Left eye exhibits chemosis and discharge. Left eye exhibits no exudate. Right conjunctiva is injected. Right conjunctiva has no hemorrhage. Left conjunctiva is injected. Left conjunctiva has no hemorrhage. Extraocular movement intact   Neck: Neck supple.   Cardiovascular: Normal rate, regular rhythm, normal heart sounds and normal pulses.   Pulmonary/Chest: Effort normal and breath sounds normal.   Abdominal: Normal appearance.   Musculoskeletal: Normal range of motion.         General: Normal range of motion.   Neurological: She is alert, oriented to person, place, and time and at baseline.   Skin: Skin is warm and dry.   Psychiatric: Her behavior is normal. Mood normal.   Nursing note and vitals reviewed.    Vision Screening    Right eye Left eye Both eyes   Without correction 20/200 20/30 20/30   With correction         Assessment:     Results for orders placed or performed in visit on 12/16/23   POCT Glucose, Hand-Held Device   Result Value Ref Range    POC Glucose 105 70 - 110 MG/DL       1. Acute disorder of eye    2. Type 2 diabetes mellitus with diabetic neuropathy, without long-term current use of insulin    3. Eye pain, bilateral        Plan:     Acute disorder of eye  -     prednisoLONE acetate (PRED FORTE) 1 % DrpS; Place 1 drop into both eyes 4  (four) times daily. for 10 days  Dispense: 10 mL; Refill: 0  -     atropine 1% (ISOPTO ATROPINE) 1 % Drop; Place 1 drop into both eyes 2 (two) times a day.  Dispense: 5 mL; Refill: 0  -     Ambulatory referral/consult to Ophthalmology    Type 2 diabetes mellitus with diabetic neuropathy, without long-term current use of insulin  -     POCT Glucose, Hand-Held Device    Eye pain, bilateral  -     prednisoLONE acetate (PRED FORTE) 1 % DrpS; Place 1 drop into both eyes 4 (four) times daily. for 10 days  Dispense: 10 mL; Refill: 0  -     atropine 1% (ISOPTO ATROPINE) 1 % Drop; Place 1 drop into both eyes 2 (two) times a day.  Dispense: 5 mL; Refill: 0        Medical Decision Making:   Urgent Care Management:  Due to patient's history of type 2 DM, glucose was checked and currently 105 in clinic. Most recent A1c was reviewed from 08/2023 and was 6.2. Diabetes appears controlled at this time.    3:45 PM: Discussed patient's case with on-call collaborating physicians. Dr. Henry recommends consult on-call ophthalmologist for further recommendations.    4:10 PM: Discussed patient's case with on-call ophthalmologist (Dr. Clint Gastelum), who recommends atropine BID and pred forte QID until she can see ophthalmology.         Patient Instructions   Please use eye drops as prescribed.  It is important to follow up with your ophthalmologist. Please call on Monday to get appointment. I will put in referral for [facility] doctor if you would like to see an [facility] doctor.  If you develop sudden vision changes, blindness, eye pain, headache, fever, or worsening of your condition, go to the ER.

## 2023-12-17 ENCOUNTER — HOSPITAL ENCOUNTER (INPATIENT)
Facility: HOSPITAL | Age: 73
LOS: 2 days | Discharge: HOME | DRG: 603 | End: 2023-12-19
Attending: EMERGENCY MEDICINE | Admitting: EMERGENCY MEDICINE
Payer: MEDICARE

## 2023-12-17 DIAGNOSIS — R22.0 FACIAL SWELLING: ICD-10-CM

## 2023-12-17 DIAGNOSIS — L03.213 PRESEPTAL CELLULITIS: Primary | ICD-10-CM

## 2023-12-17 DIAGNOSIS — R07.9 CHEST PAIN: ICD-10-CM

## 2023-12-17 PROBLEM — I16.0 HYPERTENSIVE URGENCY: Status: ACTIVE | Noted: 2023-12-17

## 2023-12-17 PROBLEM — E11.9 CONTROLLED TYPE 2 DIABETES MELLITUS WITHOUT COMPLICATION, WITHOUT LONG-TERM CURRENT USE OF INSULIN: Status: ACTIVE | Noted: 2023-12-17

## 2023-12-17 LAB
ALBUMIN SERPL BCP-MCNC: 3.6 G/DL (ref 3.5–5.2)
ALLENS TEST: NORMAL
ALP SERPL-CCNC: 94 U/L (ref 55–135)
ALT SERPL W/O P-5'-P-CCNC: 10 U/L (ref 10–44)
ANION GAP SERPL CALC-SCNC: 8 MMOL/L (ref 8–16)
AST SERPL-CCNC: 20 U/L (ref 10–40)
BACTERIA #/AREA URNS AUTO: ABNORMAL /HPF
BASOPHILS # BLD AUTO: 0.04 K/UL (ref 0–0.2)
BASOPHILS NFR BLD: 0.3 % (ref 0–1.9)
BILIRUB SERPL-MCNC: 0.5 MG/DL (ref 0.1–1)
BILIRUB UR QL STRIP: NEGATIVE
BUN SERPL-MCNC: 16 MG/DL (ref 8–23)
CALCIUM SERPL-MCNC: 9.2 MG/DL (ref 8.7–10.5)
CHLORIDE SERPL-SCNC: 108 MMOL/L (ref 95–110)
CLARITY UR REFRACT.AUTO: CLEAR
CO2 SERPL-SCNC: 25 MMOL/L (ref 23–29)
COLOR UR AUTO: COLORLESS
CREAT SERPL-MCNC: 1.3 MG/DL (ref 0.5–1.4)
DIFFERENTIAL METHOD: ABNORMAL
EOSINOPHIL # BLD AUTO: 0.2 K/UL (ref 0–0.5)
EOSINOPHIL NFR BLD: 2 % (ref 0–8)
ERYTHROCYTE [DISTWIDTH] IN BLOOD BY AUTOMATED COUNT: 13.5 % (ref 11.5–14.5)
ERYTHROCYTE [SEDIMENTATION RATE] IN BLOOD BY PHOTOMETRIC METHOD: 18 MM/HR (ref 0–36)
EST. GFR  (NO RACE VARIABLE): 42.9 ML/MIN/1.73 M^2
GLUCOSE SERPL-MCNC: 116 MG/DL (ref 70–110)
GLUCOSE UR QL STRIP: ABNORMAL
HCT VFR BLD AUTO: 45.7 % (ref 37–48.5)
HCV AB SERPL QL IA: NORMAL
HGB BLD-MCNC: 14.7 G/DL (ref 12–16)
HGB UR QL STRIP: NEGATIVE
HIV 1+2 AB+HIV1 P24 AG SERPL QL IA: NORMAL
IMM GRANULOCYTES # BLD AUTO: 0.1 K/UL (ref 0–0.04)
IMM GRANULOCYTES NFR BLD AUTO: 0.8 % (ref 0–0.5)
KETONES UR QL STRIP: NEGATIVE
LDH SERPL L TO P-CCNC: 0.78 MMOL/L (ref 0.5–2.2)
LEUKOCYTE ESTERASE UR QL STRIP: ABNORMAL
LYMPHOCYTES # BLD AUTO: 2.2 K/UL (ref 1–4.8)
LYMPHOCYTES NFR BLD: 18.1 % (ref 18–48)
MCH RBC QN AUTO: 28.3 PG (ref 27–31)
MCHC RBC AUTO-ENTMCNC: 32.2 G/DL (ref 32–36)
MCV RBC AUTO: 88 FL (ref 82–98)
MICROSCOPIC COMMENT: ABNORMAL
MONOCYTES # BLD AUTO: 0.8 K/UL (ref 0.3–1)
MONOCYTES NFR BLD: 6.8 % (ref 4–15)
NEUTROPHILS # BLD AUTO: 8.6 K/UL (ref 1.8–7.7)
NEUTROPHILS NFR BLD: 72 % (ref 38–73)
NITRITE UR QL STRIP: NEGATIVE
NRBC BLD-RTO: 0 /100 WBC
PH UR STRIP: 7 [PH] (ref 5–8)
PLATELET # BLD AUTO: 186 K/UL (ref 150–450)
PMV BLD AUTO: 12.2 FL (ref 9.2–12.9)
POCT GLUCOSE: 84 MG/DL (ref 70–110)
POTASSIUM SERPL-SCNC: 4.1 MMOL/L (ref 3.5–5.1)
PROT SERPL-MCNC: 7.2 G/DL (ref 6–8.4)
PROT UR QL STRIP: NEGATIVE
RBC # BLD AUTO: 5.2 M/UL (ref 4–5.4)
RBC #/AREA URNS AUTO: 1 /HPF (ref 0–4)
SAMPLE: NORMAL
SITE: NORMAL
SODIUM SERPL-SCNC: 141 MMOL/L (ref 136–145)
SP GR UR STRIP: 1.02 (ref 1–1.03)
SQUAMOUS #/AREA URNS AUTO: 1 /HPF
URN SPEC COLLECT METH UR: ABNORMAL
VIT B12 SERPL-MCNC: 270 PG/ML (ref 210–950)
WBC # BLD AUTO: 11.99 K/UL (ref 3.9–12.7)
WBC #/AREA URNS AUTO: 9 /HPF (ref 0–5)
YEAST UR QL AUTO: ABNORMAL

## 2023-12-17 PROCEDURE — 85652 RBC SED RATE AUTOMATED: CPT | Performed by: EMERGENCY MEDICINE

## 2023-12-17 PROCEDURE — 84207 ASSAY OF VITAMIN B-6: CPT | Performed by: PHYSICIAN ASSISTANT

## 2023-12-17 PROCEDURE — 96365 THER/PROPH/DIAG IV INF INIT: CPT

## 2023-12-17 PROCEDURE — 81001 URINALYSIS AUTO W/SCOPE: CPT | Performed by: STUDENT IN AN ORGANIZED HEALTH CARE EDUCATION/TRAINING PROGRAM

## 2023-12-17 PROCEDURE — 96366 THER/PROPH/DIAG IV INF ADDON: CPT

## 2023-12-17 PROCEDURE — 63600175 PHARM REV CODE 636 W HCPCS: Performed by: EMERGENCY MEDICINE

## 2023-12-17 PROCEDURE — 63600175 PHARM REV CODE 636 W HCPCS: Performed by: STUDENT IN AN ORGANIZED HEALTH CARE EDUCATION/TRAINING PROGRAM

## 2023-12-17 PROCEDURE — 93010 ELECTROCARDIOGRAM REPORT: CPT | Mod: ,,, | Performed by: INTERNAL MEDICINE

## 2023-12-17 PROCEDURE — 99285 EMERGENCY DEPT VISIT HI MDM: CPT | Mod: 25

## 2023-12-17 PROCEDURE — 25000003 PHARM REV CODE 250: Performed by: STUDENT IN AN ORGANIZED HEALTH CARE EDUCATION/TRAINING PROGRAM

## 2023-12-17 PROCEDURE — 93010 EKG 12-LEAD: ICD-10-PCS | Mod: ,,, | Performed by: INTERNAL MEDICINE

## 2023-12-17 PROCEDURE — 87389 HIV-1 AG W/HIV-1&-2 AB AG IA: CPT | Performed by: PHYSICIAN ASSISTANT

## 2023-12-17 PROCEDURE — 84591 ASSAY OF NOS VITAMIN: CPT | Performed by: PHYSICIAN ASSISTANT

## 2023-12-17 PROCEDURE — 83605 ASSAY OF LACTIC ACID: CPT

## 2023-12-17 PROCEDURE — 21400001 HC TELEMETRY ROOM

## 2023-12-17 PROCEDURE — 25500020 PHARM REV CODE 255: Performed by: EMERGENCY MEDICINE

## 2023-12-17 PROCEDURE — 93005 ELECTROCARDIOGRAM TRACING: CPT

## 2023-12-17 PROCEDURE — 99900035 HC TECH TIME PER 15 MIN (STAT)

## 2023-12-17 PROCEDURE — 86803 HEPATITIS C AB TEST: CPT | Performed by: PHYSICIAN ASSISTANT

## 2023-12-17 PROCEDURE — 84425 ASSAY OF VITAMIN B-1: CPT | Performed by: STUDENT IN AN ORGANIZED HEALTH CARE EDUCATION/TRAINING PROGRAM

## 2023-12-17 PROCEDURE — 82607 VITAMIN B-12: CPT | Performed by: PHYSICIAN ASSISTANT

## 2023-12-17 PROCEDURE — 25000003 PHARM REV CODE 250: Performed by: EMERGENCY MEDICINE

## 2023-12-17 PROCEDURE — 85025 COMPLETE CBC W/AUTO DIFF WBC: CPT | Performed by: EMERGENCY MEDICINE

## 2023-12-17 PROCEDURE — 96367 TX/PROPH/DG ADDL SEQ IV INF: CPT

## 2023-12-17 PROCEDURE — 80053 COMPREHEN METABOLIC PANEL: CPT | Performed by: EMERGENCY MEDICINE

## 2023-12-17 PROCEDURE — 25000003 PHARM REV CODE 250: Performed by: PHYSICIAN ASSISTANT

## 2023-12-17 PROCEDURE — 87040 BLOOD CULTURE FOR BACTERIA: CPT | Performed by: EMERGENCY MEDICINE

## 2023-12-17 RX ORDER — INSULIN ASPART 100 [IU]/ML
0-10 INJECTION, SOLUTION INTRAVENOUS; SUBCUTANEOUS
Status: DISCONTINUED | OUTPATIENT
Start: 2023-12-17 | End: 2023-12-19 | Stop reason: HOSPADM

## 2023-12-17 RX ORDER — IBUPROFEN 200 MG
24 TABLET ORAL
Status: DISCONTINUED | OUTPATIENT
Start: 2023-12-17 | End: 2023-12-19 | Stop reason: HOSPADM

## 2023-12-17 RX ORDER — TETRACAINE HYDROCHLORIDE 5 MG/ML
2 SOLUTION OPHTHALMIC
Status: COMPLETED | OUTPATIENT
Start: 2023-12-17 | End: 2023-12-17

## 2023-12-17 RX ORDER — IBUPROFEN 200 MG
16 TABLET ORAL
Status: DISCONTINUED | OUTPATIENT
Start: 2023-12-17 | End: 2023-12-19 | Stop reason: HOSPADM

## 2023-12-17 RX ORDER — SODIUM CHLORIDE 0.9 % (FLUSH) 0.9 %
10 SYRINGE (ML) INJECTION EVERY 8 HOURS PRN
Status: DISCONTINUED | OUTPATIENT
Start: 2023-12-17 | End: 2023-12-19 | Stop reason: HOSPADM

## 2023-12-17 RX ORDER — ENOXAPARIN SODIUM 100 MG/ML
40 INJECTION SUBCUTANEOUS EVERY 24 HOURS
Status: DISCONTINUED | OUTPATIENT
Start: 2023-12-17 | End: 2023-12-19 | Stop reason: HOSPADM

## 2023-12-17 RX ORDER — ONDANSETRON 8 MG/1
8 TABLET, ORALLY DISINTEGRATING ORAL EVERY 8 HOURS PRN
Status: DISCONTINUED | OUTPATIENT
Start: 2023-12-17 | End: 2023-12-19 | Stop reason: HOSPADM

## 2023-12-17 RX ORDER — OXYCODONE HYDROCHLORIDE 5 MG/1
5 TABLET ORAL EVERY 6 HOURS PRN
Status: DISCONTINUED | OUTPATIENT
Start: 2023-12-17 | End: 2023-12-19 | Stop reason: HOSPADM

## 2023-12-17 RX ORDER — NALOXONE HCL 0.4 MG/ML
0.02 VIAL (ML) INJECTION
Status: DISCONTINUED | OUTPATIENT
Start: 2023-12-17 | End: 2023-12-19 | Stop reason: HOSPADM

## 2023-12-17 RX ORDER — TALC
6 POWDER (GRAM) TOPICAL NIGHTLY PRN
Status: DISCONTINUED | OUTPATIENT
Start: 2023-12-17 | End: 2023-12-19 | Stop reason: HOSPADM

## 2023-12-17 RX ORDER — HYDRALAZINE HYDROCHLORIDE 25 MG/1
25 TABLET, FILM COATED ORAL EVERY 8 HOURS PRN
Status: DISCONTINUED | OUTPATIENT
Start: 2023-12-17 | End: 2023-12-19 | Stop reason: HOSPADM

## 2023-12-17 RX ORDER — IPRATROPIUM BROMIDE AND ALBUTEROL SULFATE 2.5; .5 MG/3ML; MG/3ML
3 SOLUTION RESPIRATORY (INHALATION) EVERY 6 HOURS PRN
Status: DISCONTINUED | OUTPATIENT
Start: 2023-12-17 | End: 2023-12-19 | Stop reason: HOSPADM

## 2023-12-17 RX ORDER — FUROSEMIDE 20 MG/1
20 TABLET ORAL 2 TIMES DAILY
Status: DISCONTINUED | OUTPATIENT
Start: 2023-12-17 | End: 2023-12-19 | Stop reason: HOSPADM

## 2023-12-17 RX ORDER — ACETAMINOPHEN 325 MG/1
650 TABLET ORAL EVERY 6 HOURS PRN
Status: DISCONTINUED | OUTPATIENT
Start: 2023-12-17 | End: 2023-12-19 | Stop reason: HOSPADM

## 2023-12-17 RX ORDER — CLINDAMYCIN PHOSPHATE 600 MG/50ML
600 INJECTION, SOLUTION INTRAVENOUS
Status: COMPLETED | OUTPATIENT
Start: 2023-12-17 | End: 2023-12-17

## 2023-12-17 RX ORDER — TALC
6 POWDER (GRAM) TOPICAL NIGHTLY PRN
Status: DISCONTINUED | OUTPATIENT
Start: 2023-12-17 | End: 2023-12-17

## 2023-12-17 RX ORDER — DIPHENHYDRAMINE HCL 25 MG
25 CAPSULE ORAL
Status: COMPLETED | OUTPATIENT
Start: 2023-12-17 | End: 2023-12-17

## 2023-12-17 RX ORDER — ATORVASTATIN CALCIUM 40 MG/1
40 TABLET, FILM COATED ORAL DAILY
Status: DISCONTINUED | OUTPATIENT
Start: 2023-12-18 | End: 2023-12-19 | Stop reason: HOSPADM

## 2023-12-17 RX ORDER — ACETAMINOPHEN 500 MG
1000 TABLET ORAL
Status: COMPLETED | OUTPATIENT
Start: 2023-12-17 | End: 2023-12-17

## 2023-12-17 RX ORDER — AMLODIPINE BESYLATE 10 MG/1
10 TABLET ORAL DAILY
Status: DISCONTINUED | OUTPATIENT
Start: 2023-12-17 | End: 2023-12-19 | Stop reason: HOSPADM

## 2023-12-17 RX ORDER — GLUCAGON 1 MG
1 KIT INJECTION
Status: DISCONTINUED | OUTPATIENT
Start: 2023-12-17 | End: 2023-12-19 | Stop reason: HOSPADM

## 2023-12-17 RX ORDER — PROCHLORPERAZINE EDISYLATE 5 MG/ML
5 INJECTION INTRAMUSCULAR; INTRAVENOUS EVERY 6 HOURS PRN
Status: DISCONTINUED | OUTPATIENT
Start: 2023-12-17 | End: 2023-12-19 | Stop reason: HOSPADM

## 2023-12-17 RX ORDER — POLYETHYLENE GLYCOL 3350 17 G/17G
17 POWDER, FOR SOLUTION ORAL DAILY
Status: DISCONTINUED | OUTPATIENT
Start: 2023-12-18 | End: 2023-12-19 | Stop reason: HOSPADM

## 2023-12-17 RX ORDER — SODIUM CHLORIDE 0.9 % (FLUSH) 0.9 %
10 SYRINGE (ML) INJECTION
Status: DISCONTINUED | OUTPATIENT
Start: 2023-12-17 | End: 2023-12-19 | Stop reason: HOSPADM

## 2023-12-17 RX ADMIN — OXYCODONE HYDROCHLORIDE 5 MG: 5 TABLET ORAL at 10:12

## 2023-12-17 RX ADMIN — HYPROMELLOSE 2910 1 DROP: 5 SOLUTION/ DROPS OPHTHALMIC at 11:12

## 2023-12-17 RX ADMIN — VANCOMYCIN HYDROCHLORIDE 1250 MG: 1.25 INJECTION, POWDER, LYOPHILIZED, FOR SOLUTION INTRAVENOUS at 01:12

## 2023-12-17 RX ADMIN — AMLODIPINE BESYLATE 10 MG: 10 TABLET ORAL at 05:12

## 2023-12-17 RX ADMIN — TETRACAINE HYDROCHLORIDE 2 DROP: 5 SOLUTION OPHTHALMIC at 12:12

## 2023-12-17 RX ADMIN — PIPERACILLIN SODIUM AND TAZOBACTAM SODIUM 4.5 G: 4; .5 INJECTION, POWDER, FOR SOLUTION INTRAVENOUS at 09:12

## 2023-12-17 RX ADMIN — SODIUM CHLORIDE 1000 ML: 9 INJECTION, SOLUTION INTRAVENOUS at 01:12

## 2023-12-17 RX ADMIN — DIPHENHYDRAMINE HYDROCHLORIDE 25 MG: 25 CAPSULE ORAL at 11:12

## 2023-12-17 RX ADMIN — ACETAMINOPHEN 1000 MG: 500 TABLET ORAL at 11:12

## 2023-12-17 RX ADMIN — CLINDAMYCIN IN 5 PERCENT DEXTROSE 600 MG: 12 INJECTION, SOLUTION INTRAVENOUS at 07:12

## 2023-12-17 RX ADMIN — HYDRALAZINE HYDROCHLORIDE 25 MG: 25 TABLET, FILM COATED ORAL at 07:12

## 2023-12-17 RX ADMIN — IOHEXOL 100 ML: 350 INJECTION, SOLUTION INTRAVENOUS at 02:12

## 2023-12-17 RX ADMIN — Medication 6 MG: at 09:12

## 2023-12-17 RX ADMIN — FUROSEMIDE 20 MG: 20 TABLET ORAL at 05:12

## 2023-12-17 RX ADMIN — FLUORESCEIN SODIUM 1 EACH: 1 STRIP OPHTHALMIC at 12:12

## 2023-12-17 RX ADMIN — PIPERACILLIN SODIUM AND TAZOBACTAM SODIUM 4.5 G: 4; .5 INJECTION, POWDER, FOR SOLUTION INTRAVENOUS at 12:12

## 2023-12-17 NOTE — ASSESSMENT & PLAN NOTE
-history of chronic facial swelling, most pronounced around both eyes R>L, dating from October 2022  -CT orbits with contrast:  evidence of symmetric skin thickening and induration in the bilateral preseptal areas with no definite evidence of postseptal/retrobulbar inflammatory changes  -Evaluated by allergy service in past, workup only positive for dust mite allergy, unlikely contributing to symptoms  -Findings of facial swelling coincide with initiation of ARB last year, suspicious for angioedema  Plan:  -Reasonable to continue treatment for preseptal cellulitis given CT findings per above   -Cont IV Vancomycin, zosyn  -Discontinue ARB, monitor for any improvement  -Ophthalmology, ENT have been consulted, appreciate recommendations  -Will pursue vitamin deficiency workup

## 2023-12-17 NOTE — H&P
[facility] Dept  Hospital Medicine  History & Physical    Patient Name: Lucrecia Foster  MRN: [id number]  Patient Class: IP- Inpatient  Admission Date: 12/17/2023  Attending Physician: Robb Gore DO   Primary Care Provider: Claus John MD         Patient information was obtained from patient and ER records.     Subjective:     Principal Problem:Preseptal cellulitis    Chief Complaint:   Chief Complaint   Patient presents with    Facial Swelling     Facial swelling x3 days. Denies ace inhibitor use. Reports similar episode in the past + states unsure of cause. Denies any new use of soaps, laundry detergent, lotions. Seen at  yesterday + given drops for eyes + states swelling has gotten worse since last night.        HPI: This is a 73-year-old female with a history of chronic facial rash, DM 2, HTN who presents with worsening facial rash.  Has been experiencing facial rash and swelling on and off over the last year.  Has been evaluated by Allergy Medicine, extensive allergic workup has been done only positive to dust mites, daily cetirizine was recommended.  States that she has been experiencing swelling, most pronounced around both eyes R>L since about October 2022.  Denies any use of new lotions, skin products, detergents, soaps.  States that last night it was the worst it has ever been.  Describes itching sensation all of her face with burning sensation.  Also endorses photophobia however has been experiencing this for 3 years due to dry eye.  States that she has been having new pain with ocular lateral movement, slight headache.  Denies any fevers, nausea.  She went to urgent care yesterday for which Pred forte and atropine drops were prescribed, only took 1 dose so far before coming to the ED    ED course:  On arrival vitals significant for BP as high as 209 over 93.  Lab work relatively unremarkable, blood cultures x2 were obtained.  Received doses of vancomycin, clindamycin, Zosyn.   Ophthalmology and ENT were consulted.  CT orbits with contrast showed evidence of symmetric skin thickening and induration in the bilateral preseptal areas with no definite evidence of postseptal/retrobulbar inflammatory changes    Past Medical History:   Diagnosis Date    Angio-edema     Arthritis     Asthma     Cataract     Diabetes mellitus, type 2     Dry eye syndrome, bilateral     Eczema     Hyperlipidemia     Hypertension     Kidney disease     Neuropathy     Sleep apnea     Stroke        Past Surgical History:   Procedure Laterality Date    BACK SURGERY      for sciatic nerver [id number]    CATARACT EXTRACTION      HIP SURGERY      TUBAL LIGATION         Review of patient's allergies indicates:   Allergen Reactions    Darvocet a500 [propoxyphene n-acetaminophen] Nausea And Vomiting    Nickel Rash    Nickel sutures [surgical stainless steel]     Vicodin [hydrocodone-acetaminophen] Nausea And Vomiting    Unable to assess Rash     Pt is allergic to nickel.       No current facility-administered medications on file prior to encounter.     Current Outpatient Medications on File Prior to Encounter   Medication Sig    albuterol (PROVENTIL) 2.5 mg /3 mL (0.083 %) nebulizer solution SMARTSIG:3 Milliliter(s) Via Nebulizer Every 8 Hours    alclomethasone (ACLOVATE) 0.05 % ointment 3 (three) times daily.    amLODIPine (NORVASC) 10 MG tablet Take 10 mg by mouth.    atorvastatin (LIPITOR) 40 MG tablet Take 40 mg by mouth once daily.    atropine 1% (ISOPTO ATROPINE) 1 % Drop Place 1 drop into both eyes 2 (two) times a day.    ciprofloxacin HCl (CILOXAN) 0.3 % ophthalmic solution     diphenhydrAMINE (BENADRYL) 25 mg capsule Take 25 mg by mouth nightly as needed for Itching.    dorzolamide-timolol 2-0.5% (COSOPT) 22.3-6.8 mg/mL ophthalmic solution INSTILL 1 DROP IN LEFT EYE TWICE A DAY    empagliflozin-linagliptin (GLYXAMBI) 10-5 mg Tab Take 1 tablet by mouth once daily.    EPINEPHrine (EPIPEN) 0.3 mg/0.3 mL AtIn Inject 0.3 mLs  (0.3 mg total) into the muscle once. for 1 dose    ergocalciferol (ERGOCALCIFEROL) 50,000 unit Cap Take 50,000 Units by mouth every 7 days.    furosemide (LASIX) 20 MG tablet Take 20 mg by mouth 2 (two) times daily.    gabapentin (NEURONTIN) 300 MG capsule Take 1 capsule (300 mg total) by mouth 3 (three) times daily. for 7 days    levocetirizine (XYZAL) 5 MG tablet Take 1 tablet by mouth every evening.    methotrexate 2.5 MG Tab SMARTSIG:3 Pill By Mouth Once a Week    prednisoLONE acetate (PRED FORTE) 1 % DrpS Place 1 drop into both eyes 4 (four) times daily. for 10 days    prednisoLONE sodium phosphate (INFLAMASE FORTE) 1 % Drop Apply 1 drop to eye 4 (four) times daily.    triamcinolone acetonide 0.1% (KENALOG) 0.1 % cream Apply topically 3 (three) times daily. for 7 days    TRUE METRIX GLUCOSE TEST STRIP Strp 2 (two) times daily.    valsartan (DIOVAN) 80 MG tablet Take 80 mg by mouth once daily.     Family History       Problem Relation (Age of Onset)    Cancer Mother, Father    Diabetes Father          Tobacco Use    Smoking status: Never    Smokeless tobacco: Never   Substance and Sexual Activity    Alcohol use: Never    Drug use: No    Sexual activity: Not Currently     Review of Systems   Constitutional:  Negative for activity change and appetite change.   HENT:  Negative for congestion, dental problem and drooling.    Eyes:  Positive for photophobia, pain, redness and itching. Negative for discharge and visual disturbance.   Respiratory:  Negative for apnea and chest tightness.    Cardiovascular:  Negative for chest pain and leg swelling.   Gastrointestinal:  Negative for abdominal distention and abdominal pain.   Endocrine: Negative for cold intolerance and heat intolerance.   Genitourinary:  Negative for difficulty urinating and dyspareunia.   Musculoskeletal:  Negative for arthralgias and back pain.   Skin:  Positive for rash. Negative for pallor and wound.   Allergic/Immunologic: Positive for  environmental allergies. Negative for food allergies and immunocompromised state.   Neurological:  Positive for headaches. Negative for dizziness and facial asymmetry.   Hematological:  Negative for adenopathy. Does not bruise/bleed easily.   Psychiatric/Behavioral:  Negative for agitation and behavioral problems.      Objective:     Vital Signs (Most Recent):  Temp: 98.5 °F (36.9 °C) (12/17/23 1025)  Pulse: 74 (12/17/23 1700)  Resp: (!) 21 (12/17/23 1700)  BP: (!) 209/91 (12/17/23 1700)  SpO2: 96 % (12/17/23 1700) Vital Signs (24h Range):  Temp:  [98.5 °F (36.9 °C)] 98.5 °F (36.9 °C)  Pulse:  [55-74] 74  Resp:  [12-21] 21  SpO2:  [96 %-100 %] 96 %  BP: (140-209)/(79-93) 209/91     Weight: 88 kg (194 lb)  Body mass index is 32.28 kg/m².     Physical Exam  Constitutional:       Appearance: Normal appearance.   HENT:      Head: Normocephalic and atraumatic.      Left Ear: Tympanic membrane normal.      Mouth/Throat:      Mouth: Mucous membranes are moist.   Eyes:      General:         Right eye: No discharge.         Left eye: No discharge.      Conjunctiva/sclera:      Right eye: Right conjunctiva is injected.      Left eye: Left conjunctiva is injected.   Cardiovascular:      Rate and Rhythm: Normal rate and regular rhythm.      Heart sounds: Normal heart sounds.   Pulmonary:      Effort: No respiratory distress.      Breath sounds: Normal breath sounds.   Abdominal:      General: Abdomen is flat. Bowel sounds are normal.      Palpations: Abdomen is soft.   Musculoskeletal:         General: Normal range of motion.      Cervical back: Normal range of motion and neck supple.   Skin:     General: Skin is warm and dry.      Capillary Refill: Capillary refill takes less than 2 seconds.      Findings: Rash present.      Comments: Facial hyperpigmentation and swelling most pronounced preseptal areas bilaterally and surrounding the eyes   Neurological:      General: No focal deficit present.      Mental Status: She is alert  "and oriented to person, place, and time.   Psychiatric:         Behavior: Behavior normal.                Significant Labs: All pertinent labs within the past 24 hours have been reviewed.    Significant Imaging: I have reviewed all pertinent imaging results/findings within the past 24 hours.  Assessment/Plan:     * Preseptal cellulitis  -history of chronic facial swelling, most pronounced around both eyes R>L, dating from October 2022  -CT orbits with contrast:  evidence of symmetric skin thickening and induration in the bilateral preseptal areas with no definite evidence of postseptal/retrobulbar inflammatory changes  -Evaluated by allergy service in past, workup only positive for dust mite allergy, unlikely contributing to symptoms  -Findings of facial swelling coincide with initiation of ARB last year, suspicious for angioedema  Plan:  -Reasonable to continue treatment for preseptal cellulitis given CT findings per above   -Cont IV Vancomycin, zosyn  -Discontinue ARB, monitor for any improvement  -Ophthalmology, ENT have been consulted, appreciate recommendations  -Will pursue vitamin deficiency workup      Controlled type 2 diabetes mellitus without complication, without long-term current use of insulin  Patient's FSGs are controlled on current medication regimen.  Last A1c reviewed-   Lab Results   Component Value Date    HGBA1C 6.1 (H) 06/20/2022     Most recent fingerstick glucose reviewed- No results for input(s): "POCTGLUCOSE" in the last 24 hours.  Current correctional scale  Medium  Maintain anti-hyperglycemic dose as follows-   Antihyperglycemics (From admission, onward)      Start     Stop Route Frequency Ordered    12/17/23 1753  insulin aspart U-100 pen 0-10 Units         -- SubQ Before meals & nightly PRN 12/17/23 1701          Hold Oral hypoglycemics while patient is in the hospital.  -Home meds: Glyxambi  -SSI while in hospital    Hypertensive urgency  Patient has a current diagnosis of " hypertensive urgency (without evidence of end organ damage) which is uncontrolled.  Latest blood pressure and vitals reviewed-   Temp:  [98.5 °F (36.9 °C)]   Pulse:  [55-74]   Resp:  [12-21]   BP: (140-209)/(79-93)   SpO2:  [96 %-100 %] .   Patient currently on IV antihypertensives.   Home meds for hypertension were reviewed and noted below.   Hypertension Medications               amLODIPine (NORVASC) 10 MG tablet Take 10 mg by mouth.    furosemide (LASIX) 20 MG tablet Take 20 mg by mouth 2 (two) times daily.    valsartan (DIOVAN) 80 MG tablet Take 80 mg by mouth once daily.            -/93 on arrival  -Cont home meds other than valsartan per above  -Hydralazine ordered prn      VTE Risk Mitigation (From admission, onward)           Ordered     enoxaparin injection 40 mg  Daily         12/17/23 1701     IP VTE HIGH RISK PATIENT  Once         12/17/23 1701     Place sequential compression device  Until discontinued         12/17/23 1701     Place sequential compression device  Until discontinued         12/17/23 1622                                    Robb Gore DO  Department of Hospital Medicine  [facility] Dept

## 2023-12-17 NOTE — ED NOTES
Patient identifiers verified and correct for Lucrecia Foster  LOC: The patient is awake, alert and aware of environment with an appropriate affect, the patient is oriented x 3 and speaking appropriately.   APPEARANCE: Patient appears uncomfortable and in no acute distress, patient is clean and well groomed.  HEENT: eyes are significantly swollen, pt reports itching/burning, peripheral vision compromised  SKIN: The skin is warm and dry, color consistent with ethnicity, patient has normal skin turgor and moist mucus membranes, skin intact, no breakdown or bruising noted.   MUSCULOSKELETAL: Patient moving all extremities spontaneously, no swelling noted.  RESPIRATORY: Airway is open and patent, respirations are spontaneous, patient has a normal effort and rate, no accessory muscle use noted, pt placed on continuous pulse ox with O2 sats noted at 98% on room air.  CARDIAC: Pt placed on cardiac monitor. Patient has a normal rate and regular rhythm, no edema noted, capillary refill < 3 seconds.   GASTRO: Soft and non tender to palpation, no distention noted, normoactive bowel sounds present in all four quadrants. Pt states bowel movements have been regular.  : Pt denies any pain or frequency with urination.  NEURO: Pt opens eyes spontaneously, behavior appropriate to situation, follows commands, facial expression symmetrical, bilateral hand grasp equal and even, purposeful motor response noted, normal sensation in all extremities when touched with a finger.

## 2023-12-17 NOTE — ED TRIAGE NOTES
Facial swelling x3 days. Denies ace inhibitor use. Reports similar episode in the past + states unsure of cause. Denies any new use of soaps, laundry detergent, lotions. Seen at  yesterday + given drops for eyes + states swelling has gotten worse since last night.

## 2023-12-17 NOTE — CONSULTS
Consultation Report  Ophthalmology Service    Date: 12/17/2023    Chief complaint/Reason for Consult: eyelid swelling, both eyes     History of Present Illness: Lucrecia Foster is a 73 y.o. female who presents with bilateral upper and lower eyelid swelling with itchiness and burning of her eyes that started yesterday. Has had similar episodes in past that resolved on their own few days later. Today she states the swelling is worse. She saw urgent care who prescribed atropine and PF, did not resolve symptoms. She denies changes in vision, diplopia, photosensitivity, flashes, floaters curtain obscuring vision. Denies sick contacts, fevers, chills.     POcularHx: CEIOL OS, chronic iridocyclitis left eye, allergic conjunctivitis both eyes,     Current eye gtts: none      PMHx:  has a past medical history of Angio-edema, Arthritis, Asthma, Cataract, Diabetes mellitus, type 2, Dry eye syndrome, bilateral, Eczema, Hyperlipidemia, Hypertension, Kidney disease, Neuropathy, Sleep apnea, and Stroke.     PSurgHx:  has a past surgical history that includes Tubal ligation; Hip surgery; Back surgery; and Cataract extraction.     Home Medications:   Prior to Admission medications    Medication Sig Start Date End Date Taking? Authorizing Provider   albuterol (PROVENTIL) 2.5 mg /3 mL (0.083 %) nebulizer solution SMARTSIG:3 Milliliter(s) Via Nebulizer Every 8 Hours 2/16/22   Provider, Historical   alclomethasone (ACLOVATE) 0.05 % ointment 3 (three) times daily. 12/22/21   Provider, Historical   amLODIPine (NORVASC) 10 MG tablet Take 10 mg by mouth.    Provider, Historical   atorvastatin (LIPITOR) 40 MG tablet Take 40 mg by mouth once daily.    Provider, Historical   atropine 1% (ISOPTO ATROPINE) 1 % Drop Place 1 drop into both eyes 2 (two) times a day. 12/16/23   Vy Thrasher PA-C   ciprofloxacin HCl (CILOXAN) 0.3 % ophthalmic solution  3/21/22   Provider, Historical   diphenhydrAMINE (BENADRYL) 25 mg capsule Take 25 mg by  mouth nightly as needed for Itching.    Provider, Historical   dorzolamide-timolol 2-0.5% (COSOPT) 22.3-6.8 mg/mL ophthalmic solution INSTILL 1 DROP IN LEFT EYE TWICE A DAY 9/16/20   Provider, Historical   empagliflozin-linagliptin (GLYXAMBI) 10-5 mg Tab Take 1 tablet by mouth once daily. 12/8/23   Provider, Historical   EPINEPHrine (EPIPEN) 0.3 mg/0.3 mL AtIn Inject 0.3 mLs (0.3 mg total) into the muscle once. for 1 dose 3/6/22 3/6/22  Amanda Levin NP   ergocalciferol (ERGOCALCIFEROL) 50,000 unit Cap Take 50,000 Units by mouth every 7 days.    Provider, Historical   furosemide (LASIX) 20 MG tablet Take 20 mg by mouth 2 (two) times daily.    Provider, Historical   gabapentin (NEURONTIN) 300 MG capsule Take 1 capsule (300 mg total) by mouth 3 (three) times daily. for 7 days 12/27/20 1/3/21  Jose Kramer NP   levocetirizine (XYZAL) 5 MG tablet Take 1 tablet by mouth every evening. 11/10/23   Provider, Historical   methotrexate 2.5 MG Tab SMARTSIG:3 Pill By Mouth Once a Week 11/10/23   Provider, Historical   prednisoLONE acetate (PRED FORTE) 1 % DrpS Place 1 drop into both eyes 4 (four) times daily. for 10 days 12/16/23 12/26/23  Vy Thrasher PA-C   prednisoLONE sodium phosphate (INFLAMASE FORTE) 1 % Drop Apply 1 drop to eye 4 (four) times daily. 9/6/23   Provider, Historical   triamcinolone acetonide 0.1% (KENALOG) 0.1 % cream Apply topically 3 (three) times daily. for 7 days 4/15/19 4/22/19  David Thomas NP   TRUE METRIX GLUCOSE TEST STRIP Strp 2 (two) times daily. 3/16/22   Provider, Historical   valsartan (DIOVAN) 80 MG tablet Take 80 mg by mouth once daily. 9/15/20   Provider, Historical        Medications this encounter:    amLODIPine  10 mg Oral Daily    [START ON 12/18/2023] atorvastatin  40 mg Oral Daily    clindamycin (CLEOCIN) IVPB  600 mg Intravenous ED 1 Time    enoxparin  40 mg Subcutaneous Daily    furosemide  20 mg Oral BID    piperacillin-tazobactam (Zosyn) IV (PEDS and ADULTS)  (extended infusion is not appropriate)  4.5 g Intravenous Q8H    [START ON 12/18/2023] polyethylene glycol  17 g Oral Daily    [START ON 12/18/2023] vancomycin (VANCOCIN) IV (PEDS and ADULTS)  1,250 mg Intravenous Q24H       Allergies: is allergic to darvocet a500 [propoxyphene n-acetaminophen], nickel, nickel sutures [surgical stainless steel], vicodin [hydrocodone-acetaminophen], and unable to assess.     Social:  reports that she has never smoked. She has never used smokeless tobacco. She reports that she does not drink alcohol and does not use drugs.     Family Hx: No family history of glaucoma, macular degeneration, or blindness. family history includes Cancer in her father and mother; Diabetes in her father.     ROS: see hpi     Ocular examination/Dilated fundus examination:  Base Eye Exam       Visual Acuity (Snellen - Linear)         Right Left    Dist sc 20/50 20/20    Dist ph sc 20/40               Tonometry (Tonopen, 5:18 PM)         Right Left    Pressure 13 12              Pupils         Dark Light Shape React    Right 4 4 Round pharm dilated    Left 4 4 Round pharm dilated              Visual Fields         Right Left     Full Full              Extraocular Movement         Right Left     Full, Ortho Full, Ortho              Neuro/Psych       Oriented x3: Yes    Mood/Affect: Normal              Dilation       Both eyes: 1% Mydriacyl, 2.5% Phenylephrine @ 5:19 PM                  Slit Lamp and Fundus Exam       External Exam         Right Left    External Edema resolved Edema resolved.              Slit Lamp Exam         Right Left    Lids/Lashes MGD MGD    Conjunctiva/Sclera +1 injection +1 injection    Cornea pee pee    Anterior Chamber Deep and quiet Deep and quiet    Iris Round and reactive Round and reactive    Lens 1+ Cortical cataract, 1+ Nuclear sclerosis Posterior chamber intraocular lens    Anterior Vitreous Normal Normal              Fundus Exam         Right Left    Disc No edema No edema     C/D Ratio 0.6 0.6    Macula Normal Normal    Vessels Normal Normal    Periphery Normal Normal                CT Orbit  1. Relatively symmetric skin thickening and induration subcutaneous fat planes in the bilateral preseptal periorbital region extending over the nasal bridge into the premalar soft tissues. Findings are in keeping with cellulitis.  No discrete drainable fluid collection or abscess appreciated.  2. No definite evidence of postseptal/retrobulbar inflammatory change.  3. Few small foci of subcutaneous air are noted at the inferior margin of the right orbit; unclear whether this air is contained within the eyelid versus in the adjoining soft tissues, the latter of which would be concerning for subcutaneous emphysema. Clinical correlation with physical examination recommended in this regard.    =======================================    Assessment/Plan:   #Preseptal cellulitis, both eyes  #Blepharitis, both eyes  - History of blepharitis and preseptal cellulitis treated in the past with oral abx with Dr. Hartmann per patient's records.   - Patient states today symptoms feel similar however slightly exacerbated relative to prior episode  - VA 20/50 phi 20/40 (note patient has cataract right eye) and 20/20. EOM full ortho and no pain with movements or restriction. IOP wnl. CVF full OU. Pupils are round pharm dilated due to taking atropine from urgent care. Color plates 12/12 full OU.  - On exam, edematous upper and lower lids both eyes obstructing patient's vision however no proptosis. 1+ injection both eyes, mgd both eyes with corneal PEE, cataract right eye, PCIOL left eye.  - DFE wnl, optic disc sharp/pink with no signs of optic neuropathy, no signs of retinopathy such as heme, tears, holes, or detachments.  - CT scan significant for preseptal soft tissue thickening, no evidence of post septal/retro bulbar inflammatory change with intact orbits  - Given exam, clinical history, and CT findings, most likely  not orbital cellulitis and component of preseptal cellulitis with possible concomitant blepharitis  - Recommend artificial tears QID both eyes, warm compresses QID both eyes  - Recommended oral antibiotics given patient presentation and clinical history, and follow up with ophhto clinic this week. Informed primary of recommendations, however primary team desires IV abx. Informed them that we will follow up if they decide to admit and start IV abx after giving current recommendations  - However if amenable to oral abx, suggest Augmentin 875-125 mg tablet BID for 10 days.   - ENT is consulted per primary due to evidence of possible subcutaneous emphysema, agree with consult  - Ophtho will follow up inpatient, will schedule outpatient appointment based on primary decision     If there are further questions, please page the on call ophthalmology resident.    Clint Gastelum MD  PGY-2, Ophthalmology  12/17/2023  5:21 PM

## 2023-12-17 NOTE — HPI
This is a 73-year-old female with a history of chronic facial rash, DM 2, HTN who presents with worsening facial rash.  Has been experiencing facial rash and swelling on and off over the last year.  Has been evaluated by Allergy Medicine, extensive allergic workup has been done only positive to dust mites, daily cetirizine was recommended.  States that she has been experiencing swelling, most pronounced around both eyes R>L since about October 2022.  Denies any use of new lotions, skin products, detergents, soaps.  States that last night it was the worst it has ever been.  Describes itching sensation all of her face with burning sensation.  Also endorses photophobia however has been experiencing this for 3 years due to dry eye.  States that she has been having new pain with ocular lateral movement, slight headache.  Denies any fevers, nausea.  She went to urgent care yesterday for which Pred forte and atropine drops were prescribed, only took 1 dose so far before coming to the ED    ED course:  On arrival vitals significant for BP as high as 209 over 93.  Lab work relatively unremarkable, blood cultures x2 were obtained.  Received doses of vancomycin, clindamycin, Zosyn.  Ophthalmology and ENT were consulted.  CT orbits with contrast showed evidence of symmetric skin thickening and induration in the bilateral preseptal areas with no definite evidence of postseptal/retrobulbar inflammatory changes

## 2023-12-17 NOTE — ASSESSMENT & PLAN NOTE
Patient has a current diagnosis of hypertensive urgency (without evidence of end organ damage) which is uncontrolled.  Latest blood pressure and vitals reviewed-   Temp:  [98.5 °F (36.9 °C)]   Pulse:  [55-74]   Resp:  [12-21]   BP: (140-209)/(79-93)   SpO2:  [96 %-100 %] .   Patient currently on IV antihypertensives.   Home meds for hypertension were reviewed and noted below.   Hypertension Medications               amLODIPine (NORVASC) 10 MG tablet Take 10 mg by mouth.    furosemide (LASIX) 20 MG tablet Take 20 mg by mouth 2 (two) times daily.    valsartan (DIOVAN) 80 MG tablet Take 80 mg by mouth once daily.            -/93 on arrival  -Cont home meds other than valsartan per above  -Hydralazine ordered prn

## 2023-12-17 NOTE — ED PROVIDER NOTES
Encounter Date: 12/17/2023       History     Chief Complaint   Patient presents with    Facial Swelling     Facial swelling x3 days. Denies ace inhibitor use. Reports similar episode in the past + states unsure of cause. Denies any new use of soaps, laundry detergent, lotions. Seen at  yesterday + given drops for eyes + states swelling has gotten worse since last night.     74 yo female presenting with eye pain, facial swelling.    PMH:  DM type 2, CKD, RASTA on CPAP, hypertension, trigeminal neuralgia  Ocular history:   · Pseudophakia of left eye   · Blepharitis of upper and lower eyelids of both eyes   · Trichiasis of eyelid of both eyes   Chronic Iridocyclitis OS, uveitis s/p cataract surgery      Context:  The patient reports bilateral eyelid swelling that began 3 days ago.  She denies new detergents or soaps.  She had not started any new medications.  She states this happens to her every few months.  Denies trauma to her face.  She went to urgent care yesterday and was prescribed eyedrops, but was instructed to be re-evaluated if her symptoms worsen.  This morning when she woke up, she noticed that her eyes were painful to move.  She feels like her vision is blurry, but she has not lost vision in either eye.  She does not wear contacts.  She wears reading glasses.  She takes losartan and amlodipine for her blood pressure.  Her eyes feel itchy and painful.    Onset:  Gradual  Location:  Bilateral eyes  Duration:  3 days  Associated Symptoms:  Denies throat closing, difficulty swallowing, difficulty breathing.  Denies fevers, but did have some chills.       The history is provided by the patient and medical records. No  was used.     Review of patient's allergies indicates:   Allergen Reactions    Darvocet a500 [propoxyphene n-acetaminophen] Nausea And Vomiting    Nickel Rash    Nickel sutures [surgical stainless steel]     Vicodin [hydrocodone-acetaminophen] Nausea And Vomiting    Unable to  assess Rash     Pt is allergic to nickel.     Past Medical History:   Diagnosis Date    Angio-edema     Arthritis     Asthma     Cataract     Diabetes mellitus, type 2     Dry eye syndrome, bilateral     Eczema     Hyperlipidemia     Hypertension     Kidney disease     Neuropathy     Sleep apnea     Stroke      Past Surgical History:   Procedure Laterality Date    BACK SURGERY      for sciatic nerver aug2017    CATARACT EXTRACTION      HIP SURGERY      TUBAL LIGATION       Family History   Problem Relation Age of Onset    Cancer Mother     Diabetes Father     Cancer Father     Allergic rhinitis Neg Hx     Allergies Neg Hx     Angioedema Neg Hx     Asthma Neg Hx     Atopy Neg Hx     Eczema Neg Hx     Immunodeficiency Neg Hx     Rhinitis Neg Hx     Urticaria Neg Hx      Social History     Tobacco Use    Smoking status: Never    Smokeless tobacco: Never   Substance Use Topics    Alcohol use: Never    Drug use: No         Physical Exam     Initial Vitals   BP Pulse Resp Temp SpO2   12/17/23 1026 12/17/23 1025 12/17/23 1025 12/17/23 1025 12/17/23 1025   (!) 175/79 70 18 98.5 °F (36.9 °C) 98 %      MAP       --                Physical Exam    Nursing note and vitals reviewed.  Constitutional: She is not diaphoretic. No distress.   HENT:   Head: Normocephalic and atraumatic.   Bilateral hardeep-orbital edema     Floor of mouth soft, no submandibular edema   Eyes: EOM are normal. Pupils are equal, round, and reactive to light. Right eye exhibits no discharge. Left eye exhibits no discharge. Right conjunctiva is injected. Left conjunctiva is injected.       Fluorescein exam:  no significant uptake     IOP:  12 right, 13 left   No proptosis    Neck: Neck supple. No tracheal deviation present.   Cardiovascular:  Normal rate and regular rhythm.           Pulmonary/Chest: Breath sounds normal. No respiratory distress.   Musculoskeletal:      Cervical back: Neck supple.     Neurological: She is alert and oriented to person, place,  and time.   Skin: Skin is warm. No rash noted.   Psychiatric: She has a normal mood and affect. Her behavior is normal.         ED Course   Procedures  Labs Reviewed   CBC W/ AUTO DIFFERENTIAL - Abnormal; Notable for the following components:       Result Value    Immature Granulocytes 0.8 (*)     Gran # (ANC) 8.6 (*)     Immature Grans (Abs) 0.10 (*)     All other components within normal limits   COMPREHENSIVE METABOLIC PANEL - Abnormal; Notable for the following components:    Glucose 116 (*)     eGFR 42.9 (*)     All other components within normal limits   URINALYSIS, REFLEX TO URINE CULTURE - Abnormal; Notable for the following components:    Color, UA Colorless (*)     Glucose, UA 4+ (*)     Leukocytes, UA 2+ (*)     All other components within normal limits    Narrative:     Specimen Source->Urine   URINALYSIS MICROSCOPIC - Abnormal; Notable for the following components:    WBC, UA 9 (*)     All other components within normal limits    Narrative:     Specimen Source->Urine   CULTURE, BLOOD    Narrative:     Aerobic and anaerobic   CULTURE, BLOOD    Narrative:     Aerobic and anaerobic   HIV 1 / 2 ANTIBODY    Narrative:     Release to patient->Immediate   HEPATITIS C ANTIBODY    Narrative:     Release to patient->Immediate   C-REACTIVE PROTEIN   SEDIMENTATION RATE   NIACIN (VITAMIN B3)   VITAMIN B12   VITAMIN B6   SEDIMENTATION RATE    Narrative:     ADD ON ESR PER SEBASTIAN CHARLES MD   VITAMIN B12    Narrative:     ADD ON VITB3, B12, AND VITB6 PER TYLER CHAUDHRY DO.        Release to patient->Immediate   VITAMIN B1   NIACIN (VITAMIN B3)   VITAMIN B6   ISTAT LACTATE   POCT GLUCOSE   POCT GLUCOSE MONITORING CONTINUOUS     EKG Readings: (Independently Interpreted)   Initial Reading: No STEMI. Previous EKG: Compared with most recent EKG Previous EKG Date: 9/28/2020. Heart Rate: 63. Ectopy: No Ectopy. Clinical Impression: Normal Sinus Rhythm   Nonspecific ST-TWA similar to prior        Imaging Results               CT ORBITS WITH CONTRAST (Final result)  Result time 12/17/23 14:46:32      Final result by Cheko Adan MD (12/17/23 14:46:32)                   Impression:      1. Relatively symmetric skin thickening and induration subcutaneous fat planes in the bilateral preseptal periorbital region extending over the nasal bridge into the premalar soft tissues. Findings are in keeping with cellulitis.  No discrete drainable fluid collection or abscess appreciated.  2. No definite evidence of postseptal/retrobulbar inflammatory change.  3. Few small foci of subcutaneous air are noted at the inferior margin of the right orbit; unclear whether this air is contained within the eyelid versus in the adjoining soft tissues, the latter of which would be concerning for subcutaneous emphysema. Clinical correlation with physical examination recommended in this regard.  4. Partially imaged periapical lucency about right maxillary 2nd premolar tooth, as may be seen in the setting periapical abscess.      Electronically signed by: Cheko Adan  Date:    12/17/2023  Time:    14:46               Narrative:    EXAMINATION:  CT ORBITS WITH CONTRAST    CLINICAL HISTORY:  Orbital cellulitis suspected;    TECHNIQUE:  Contrast-enhanced CT imaging of the orbits was performed following intravenous administration of 100 mL Omnipaque 350 contrast.    COMPARISON:  None    FINDINGS:  Bony orbits: No acute fracture or destructive lesion is identified.    Globes: The globes appear normal.  Status post left lens replacement.    Extraocular muscles: The extraocular muscles are symmetric and non-enlarged.    Optic nerve/sheath complexes: There is no CT abnormality of the optic nerve/sheath complexes.    Periorbita: Relatively symmetric skin thickening and induration subcutaneous fat planes in the bilateral preseptal periorbital region extending over the nasal bridge into the premalar soft tissues.  No definite intra or discrete drainable fluid  collection is appreciated.  Few small foci of subcutaneous air are noted at the inferior margin of the right orbit (series 2, image 98); unclear whether this air is contained within the eyelid versus in the adjoining soft tissues, the latter of which would be concerning for subcutaneous emphysema.  Clinical correlation with physical examination recommended in this regard.    Retrobulbar fat: The retrobulbar fat is normal bilaterally.    Paranasal sinuses: Scattered relatively modest paranasal sinus mucosal thickening.  No definite fluid levels or frothy debris appreciated.    Mastoids and middle ear cavities: The imaged mastoid and middle ear cavities are essentially clear.    Intracranial contents: No definite abnormality identified on limited imaging.    Additional comment: Partially imaged periapical lucency about right maxillary 2nd premolar tooth.                                       Medications   sodium chloride 0.9% flush 10 mL (has no administration in time range)   melatonin tablet 6 mg (has no administration in time range)   piperacillin-tazobactam (ZOSYN) 4.5 g in dextrose 5 % in water (D5W) 100 mL IVPB (MB+) (has no administration in time range)   vancomycin - pharmacy to dose (has no administration in time range)   amLODIPine tablet 10 mg (10 mg Oral Given 12/17/23 1730)   atorvastatin tablet 40 mg (has no administration in time range)   glucose chewable tablet 16 g (has no administration in time range)   glucose chewable tablet 24 g (has no administration in time range)   glucagon (human recombinant) injection 1 mg (has no administration in time range)   insulin aspart U-100 pen 0-10 Units (has no administration in time range)   hydrALAZINE tablet 25 mg (25 mg Oral Given 12/17/23 1920)   furosemide tablet 20 mg (20 mg Oral Given 12/17/23 1730)   sodium chloride 0.9% flush 10 mL (has no administration in time range)   enoxaparin injection 40 mg (40 mg Subcutaneous Not Given 12/17/23 1730)    albuterol-ipratropium 2.5 mg-0.5 mg/3 mL nebulizer solution 3 mL (has no administration in time range)   ondansetron disintegrating tablet 8 mg (has no administration in time range)   prochlorperazine injection Soln 5 mg (has no administration in time range)   polyethylene glycol packet 17 g (has no administration in time range)   acetaminophen tablet 650 mg (has no administration in time range)   naloxone 0.4 mg/mL injection 0.02 mg (has no administration in time range)   dextrose 10% bolus 125 mL 125 mL (has no administration in time range)   dextrose 10% bolus 250 mL 250 mL (has no administration in time range)   vancomycin 1,250 mg in dextrose 5 % (D5W) 250 mL IVPB (Vial-Mate) (1,250 mg Intravenous Trough Due As Scheduled Before Dose 12/19/23 1230)   diphenhydrAMINE capsule 25 mg (25 mg Oral Given 12/17/23 1153)   TETRAcaine HCl (PF) 0.5 % Drop 2 drop (2 drops Both Eyes Given by Provider 12/17/23 1204)   fluorescein ophthalmic strip 1 each (1 each Both Eyes Given by Provider 12/17/23 1203)   acetaminophen tablet 1,000 mg (1,000 mg Oral Given 12/17/23 1153)   vancomycin 1,250 mg in dextrose 5 % (D5W) 250 mL IVPB (Vial-Mate) (0 mg Intravenous Stopped 12/17/23 1644)   piperacillin-tazobactam (ZOSYN) 4.5 g in dextrose 5 % in water (D5W) 100 mL IVPB (MB+) (0 g Intravenous Stopped 12/17/23 1315)   sodium chloride 0.9% bolus 1,000 mL 1,000 mL (0 mLs Intravenous Stopped 12/17/23 1645)   iohexoL (OMNIPAQUE 350) injection 100 mL (100 mLs Intravenous Given 12/17/23 1429)   clindamycin in D5W 600 mg/50 mL IVPB 600 mg (600 mg Intravenous New Bag 12/17/23 1902)     Medical Decision Making  73-year-old female presenting with bilateral eye swelling, painful eye movements.  On arrival, she is afebrile, no distress.    Differential including, but not limited to:  Orbital cellulitis, preseptal cellulitis, conjunctivitis, allergic reaction, angioedema    Case discussed with Ophthalmology out of concern/consideration for orbital  cellulitis, who evaluated the patient in the ED.    Pursued advanced imaging to assess for emergent intra-ocular pathology, notable for:  Few small foci of subcutaneous air are noted at the inferior margin of the right orbit; unclear whether this air is contained within the eyelid versus in the adjoining soft tissues, the latter of which would be concerning for subcutaneous emphysema.  Consistent with cellulitis, no collection.  No dental pain.     Most likely preseptal cellulitis, given the noted subcutaneous air, did discussed the case with ENT-no emergent surgical intervention at this time, but agrees with admission for continued intravenous antibiotics and observation.  Patient understands and agrees with this plan.    Amount and/or Complexity of Data Reviewed  External Data Reviewed: radiology and notes.     Details: Note reviewed from yesterday, patient was prescribed Pred Forte and atropine.    2019 ECHO:   1. Normal left ventricular systolic function.    2. Aortic valve sclerosis without stenosis.    3. LV diastolic function is mildly abnormal (Grade I).    4. Right ventricular systolic function appears normal.     Labs: ordered. Decision-making details documented in ED Course.  Radiology: ordered.  ECG/medicine tests: ordered and independent interpretation performed.    Risk  OTC drugs.  Prescription drug management.  Decision regarding hospitalization.               ED Course as of 12/17/23 1935   Sun Dec 17, 2023   1139 Temp: 98.5 °F (36.9 °C) [AB]   1140 Confirmed patient has tolerated tylenol previously.  She took one benadryl tablet early this morning, would like another for itching.  [AB]   1214 Placed ultrasound-guided IV [AB]   1228 WBC: 11.99  No leukocytosis  [AB]   1228 POC Lactate: 0.78  Normal  [AB]   1228 Hemoglobin: 14.7  No anemia  [AB]   1244 Creatinine: 1.3  Most recent 1.1 [AB]   1328 Right Eye   Right Visual Status Uncorrected  Right Visual Test 20/30  Left Eye   Left Visual  Status Uncorrected  Left Visual Test 20/50  Both Eyes   Both Visual Status Uncorrected  Both Visual Test 20/40     [AB]   1745 Sed Rate: 18 [AB]      ED Course User Index  [AB] Binu Contreras MD                             Clinical Impression:  Final diagnoses:  [R22.0] Facial swelling  [L03.213] Preseptal cellulitis (Primary)          ED Disposition Condition    Admit Stable                Binu Contreras MD  12/17/23 1935

## 2023-12-17 NOTE — SUBJECTIVE & OBJECTIVE
Past Medical History:   Diagnosis Date    Angio-edema     Arthritis     Asthma     Cataract     Diabetes mellitus, type 2     Dry eye syndrome, bilateral     Eczema     Hyperlipidemia     Hypertension     Kidney disease     Neuropathy     Sleep apnea     Stroke        Past Surgical History:   Procedure Laterality Date    BACK SURGERY      for sciatic nerver aug2017    CATARACT EXTRACTION      HIP SURGERY      TUBAL LIGATION         Review of patient's allergies indicates:   Allergen Reactions    Darvocet a500 [propoxyphene n-acetaminophen] Nausea And Vomiting    Nickel Rash    Nickel sutures [surgical stainless steel]     Vicodin [hydrocodone-acetaminophen] Nausea And Vomiting    Unable to assess Rash     Pt is allergic to nickel.       No current facility-administered medications on file prior to encounter.     Current Outpatient Medications on File Prior to Encounter   Medication Sig    albuterol (PROVENTIL) 2.5 mg /3 mL (0.083 %) nebulizer solution SMARTSIG:3 Milliliter(s) Via Nebulizer Every 8 Hours    alclomethasone (ACLOVATE) 0.05 % ointment 3 (three) times daily.    amLODIPine (NORVASC) 10 MG tablet Take 10 mg by mouth.    atorvastatin (LIPITOR) 40 MG tablet Take 40 mg by mouth once daily.    atropine 1% (ISOPTO ATROPINE) 1 % Drop Place 1 drop into both eyes 2 (two) times a day.    ciprofloxacin HCl (CILOXAN) 0.3 % ophthalmic solution     diphenhydrAMINE (BENADRYL) 25 mg capsule Take 25 mg by mouth nightly as needed for Itching.    dorzolamide-timolol 2-0.5% (COSOPT) 22.3-6.8 mg/mL ophthalmic solution INSTILL 1 DROP IN LEFT EYE TWICE A DAY    empagliflozin-linagliptin (GLYXAMBI) 10-5 mg Tab Take 1 tablet by mouth once daily.    EPINEPHrine (EPIPEN) 0.3 mg/0.3 mL AtIn Inject 0.3 mLs (0.3 mg total) into the muscle once. for 1 dose    ergocalciferol (ERGOCALCIFEROL) 50,000 unit Cap Take 50,000 Units by mouth every 7 days.    furosemide (LASIX) 20 MG tablet Take 20 mg by mouth 2 (two) times daily.    gabapentin  (NEURONTIN) 300 MG capsule Take 1 capsule (300 mg total) by mouth 3 (three) times daily. for 7 days    levocetirizine (XYZAL) 5 MG tablet Take 1 tablet by mouth every evening.    methotrexate 2.5 MG Tab SMARTSIG:3 Pill By Mouth Once a Week    prednisoLONE acetate (PRED FORTE) 1 % DrpS Place 1 drop into both eyes 4 (four) times daily. for 10 days    prednisoLONE sodium phosphate (INFLAMASE FORTE) 1 % Drop Apply 1 drop to eye 4 (four) times daily.    triamcinolone acetonide 0.1% (KENALOG) 0.1 % cream Apply topically 3 (three) times daily. for 7 days    TRUE METRIX GLUCOSE TEST STRIP Strp 2 (two) times daily.    valsartan (DIOVAN) 80 MG tablet Take 80 mg by mouth once daily.     Family History       Problem Relation (Age of Onset)    Cancer Mother, Father    Diabetes Father          Tobacco Use    Smoking status: Never    Smokeless tobacco: Never   Substance and Sexual Activity    Alcohol use: Never    Drug use: No    Sexual activity: Not Currently     Review of Systems   Constitutional:  Negative for activity change and appetite change.   HENT:  Negative for congestion, dental problem and drooling.    Eyes:  Positive for photophobia, pain, redness and itching. Negative for discharge and visual disturbance.   Respiratory:  Negative for apnea and chest tightness.    Cardiovascular:  Negative for chest pain and leg swelling.   Gastrointestinal:  Negative for abdominal distention and abdominal pain.   Endocrine: Negative for cold intolerance and heat intolerance.   Genitourinary:  Negative for difficulty urinating and dyspareunia.   Musculoskeletal:  Negative for arthralgias and back pain.   Skin:  Positive for rash. Negative for pallor and wound.   Allergic/Immunologic: Positive for environmental allergies. Negative for food allergies and immunocompromised state.   Neurological:  Positive for headaches. Negative for dizziness and facial asymmetry.   Hematological:  Negative for adenopathy. Does not bruise/bleed easily.    Psychiatric/Behavioral:  Negative for agitation and behavioral problems.      Objective:     Vital Signs (Most Recent):  Temp: 98.5 °F (36.9 °C) (12/17/23 1025)  Pulse: 74 (12/17/23 1700)  Resp: (!) 21 (12/17/23 1700)  BP: (!) 209/91 (12/17/23 1700)  SpO2: 96 % (12/17/23 1700) Vital Signs (24h Range):  Temp:  [98.5 °F (36.9 °C)] 98.5 °F (36.9 °C)  Pulse:  [55-74] 74  Resp:  [12-21] 21  SpO2:  [96 %-100 %] 96 %  BP: (140-209)/(79-93) 209/91     Weight: 88 kg (194 lb)  Body mass index is 32.28 kg/m².     Physical Exam  Constitutional:       Appearance: Normal appearance.   HENT:      Head: Normocephalic and atraumatic.      Left Ear: Tympanic membrane normal.      Mouth/Throat:      Mouth: Mucous membranes are moist.   Eyes:      General:         Right eye: No discharge.         Left eye: No discharge.      Conjunctiva/sclera:      Right eye: Right conjunctiva is injected.      Left eye: Left conjunctiva is injected.   Cardiovascular:      Rate and Rhythm: Normal rate and regular rhythm.      Heart sounds: Normal heart sounds.   Pulmonary:      Effort: No respiratory distress.      Breath sounds: Normal breath sounds.   Abdominal:      General: Abdomen is flat. Bowel sounds are normal.      Palpations: Abdomen is soft.   Musculoskeletal:         General: Normal range of motion.      Cervical back: Normal range of motion and neck supple.   Skin:     General: Skin is warm and dry.      Capillary Refill: Capillary refill takes less than 2 seconds.      Findings: Rash present.      Comments: Facial hyperpigmentation and swelling most pronounced preseptal areas bilaterally and surrounding the eyes   Neurological:      General: No focal deficit present.      Mental Status: She is alert and oriented to person, place, and time.   Psychiatric:         Behavior: Behavior normal.                Significant Labs: All pertinent labs within the past 24 hours have been reviewed.    Significant Imaging: I have reviewed all  pertinent imaging results/findings within the past 24 hours.

## 2023-12-17 NOTE — PROGRESS NOTES
"Pharmacokinetic Initial Assessment: IV Vancomycin    Assessment/Plan:    Initiate intravenous vancomycin with loading dose of 1250 mg once, done in ED, followed by a maintenance dose of vancomycin 1250 mg IV every 24 hours.  Desired empiric serum trough concentration is 10 to 20 mcg/mL.  Draw vancomycin trough level 60 min prior to third dose on 12/19/2023 at 1230.  Pharmacy will continue to follow and monitor vancomycin.      Please contact pharmacy at extension [phone] with any questions regarding this assessment.     Thank you for the consult,   Donna Levin       Patient brief summary:  Lucrecia Foster is a 73 y.o. female initiated on antimicrobial therapy with IV Vancomycin for treatment of suspected skin & soft tissue infection.    Drug Allergies:   Review of patient's allergies indicates:   Allergen Reactions    Darvocet a500 [propoxyphene n-acetaminophen] Nausea And Vomiting    Nickel Rash    Nickel sutures [surgical stainless steel]     Vicodin [hydrocodone-acetaminophen] Nausea And Vomiting    Unable to assess Rash     Pt is allergic to nickel.       Actual Body Weight:   88 kg    Renal Function:   Estimated Creatinine Clearance: 41 mL/min (based on SCr of 1.3 mg/dL).    CBC (last 72 hours):  Recent Labs   Lab Result Units 12/17/23  1205   WBC K/uL 11.99   Hemoglobin g/dL 14.7   Hematocrit % 45.7   Platelets K/uL 186   Gran % % 72.0   Lymph % % 18.1   Mono % % 6.8   Eosinophil % % 2.0   Basophil % % 0.3   Differential Method  Automated       Metabolic Panel (last 72 hours):  Recent Labs   Lab Result Units 12/17/23  1205   Sodium mmol/L 141   Potassium mmol/L 4.1   Chloride mmol/L 108   CO2 mmol/L 25   Glucose mg/dL 116*   BUN mg/dL 16   Creatinine mg/dL 1.3   Albumin g/dL 3.6   Total Bilirubin mg/dL 0.5   Alkaline Phosphatase U/L 94   AST U/L 20   ALT U/L 10       Drug levels (last 3 results):  No results for input(s): "VANCOMYCINRA", "VANCORANDOM", "VANCOMYCINPE", "VANCOPEAK", "VANCOMYCINTR", " ""[facility]" in the last 72 hours.    Microbiologic Results:  Microbiology Results (last 7 days)       Procedure Component Value Units Date/Time    Blood culture x two cultures. Draw prior to antibiotics. [[id number]] Collected: 12/17/23 1205    Order Status: Sent Specimen: Blood from Peripheral, Antecubital, Right Updated: 12/17/23 1213    Blood culture x two cultures. Draw prior to antibiotics. [[id number]] Collected: 12/17/23 1205    Order Status: Sent Specimen: Blood from Peripheral, Antecubital, Right Updated: 12/17/23 1213            "

## 2023-12-17 NOTE — ASSESSMENT & PLAN NOTE
"Patient's FSGs are controlled on current medication regimen.  Last A1c reviewed-   Lab Results   Component Value Date    HGBA1C 6.1 (H) 06/20/2022     Most recent fingerstick glucose reviewed- No results for input(s): "POCTGLUCOSE" in the last 24 hours.  Current correctional scale  Medium  Maintain anti-hyperglycemic dose as follows-   Antihyperglycemics (From admission, onward)      Start     Stop Route Frequency Ordered    12/17/23 1753  insulin aspart U-100 pen 0-10 Units         -- SubQ Before meals & nightly PRN 12/17/23 1701          Hold Oral hypoglycemics while patient is in the hospital.  -Home meds: Glyxambi  -SSI while in hospital  "

## 2023-12-18 LAB
ANION GAP SERPL CALC-SCNC: 9 MMOL/L (ref 8–16)
BASOPHILS # BLD AUTO: 0.05 K/UL (ref 0–0.2)
BASOPHILS NFR BLD: 0.4 % (ref 0–1.9)
BUN SERPL-MCNC: 15 MG/DL (ref 8–23)
CALCIUM SERPL-MCNC: 9.8 MG/DL (ref 8.7–10.5)
CHLORIDE SERPL-SCNC: 104 MMOL/L (ref 95–110)
CO2 SERPL-SCNC: 28 MMOL/L (ref 23–29)
CREAT SERPL-MCNC: 1.2 MG/DL (ref 0.5–1.4)
DIFFERENTIAL METHOD: ABNORMAL
EOSINOPHIL # BLD AUTO: 0.3 K/UL (ref 0–0.5)
EOSINOPHIL NFR BLD: 2.8 % (ref 0–8)
ERYTHROCYTE [DISTWIDTH] IN BLOOD BY AUTOMATED COUNT: 13.7 % (ref 11.5–14.5)
ERYTHROCYTE [SEDIMENTATION RATE] IN BLOOD BY PHOTOMETRIC METHOD: 33 MM/HR (ref 0–36)
EST. GFR  (NO RACE VARIABLE): 47.2 ML/MIN/1.73 M^2
ESTIMATED AVG GLUCOSE: 143 MG/DL (ref 68–131)
GLUCOSE SERPL-MCNC: 108 MG/DL (ref 70–110)
HBA1C MFR BLD: 6.6 % (ref 4–5.6)
HCT VFR BLD AUTO: 52.2 % (ref 37–48.5)
HGB BLD-MCNC: 16.4 G/DL (ref 12–16)
IMM GRANULOCYTES # BLD AUTO: 0.1 K/UL (ref 0–0.04)
IMM GRANULOCYTES NFR BLD AUTO: 0.8 % (ref 0–0.5)
LYMPHOCYTES # BLD AUTO: 2.1 K/UL (ref 1–4.8)
LYMPHOCYTES NFR BLD: 17.6 % (ref 18–48)
MAGNESIUM SERPL-MCNC: 2.2 MG/DL (ref 1.6–2.6)
MCH RBC QN AUTO: 28.1 PG (ref 27–31)
MCHC RBC AUTO-ENTMCNC: 31.4 G/DL (ref 32–36)
MCV RBC AUTO: 90 FL (ref 82–98)
MONOCYTES # BLD AUTO: 1.1 K/UL (ref 0.3–1)
MONOCYTES NFR BLD: 9.5 % (ref 4–15)
NEUTROPHILS # BLD AUTO: 8.2 K/UL (ref 1.8–7.7)
NEUTROPHILS NFR BLD: 68.9 % (ref 38–73)
NRBC BLD-RTO: 0 /100 WBC
PHOSPHATE SERPL-MCNC: 4.6 MG/DL (ref 2.7–4.5)
PLATELET # BLD AUTO: 100 K/UL (ref 150–450)
PMV BLD AUTO: 12 FL (ref 9.2–12.9)
POCT GLUCOSE: 105 MG/DL (ref 70–110)
POCT GLUCOSE: 128 MG/DL (ref 70–110)
POCT GLUCOSE: 134 MG/DL (ref 70–110)
POCT GLUCOSE: 159 MG/DL (ref 70–110)
POTASSIUM SERPL-SCNC: 4 MMOL/L (ref 3.5–5.1)
RBC # BLD AUTO: 5.83 M/UL (ref 4–5.4)
SODIUM SERPL-SCNC: 141 MMOL/L (ref 136–145)
WBC # BLD AUTO: 11.87 K/UL (ref 3.9–12.7)

## 2023-12-18 PROCEDURE — 25000003 PHARM REV CODE 250: Performed by: STUDENT IN AN ORGANIZED HEALTH CARE EDUCATION/TRAINING PROGRAM

## 2023-12-18 PROCEDURE — 86038 ANTINUCLEAR ANTIBODIES: CPT | Performed by: STUDENT IN AN ORGANIZED HEALTH CARE EDUCATION/TRAINING PROGRAM

## 2023-12-18 PROCEDURE — 86225 DNA ANTIBODY NATIVE: CPT | Performed by: STUDENT IN AN ORGANIZED HEALTH CARE EDUCATION/TRAINING PROGRAM

## 2023-12-18 PROCEDURE — 25000003 PHARM REV CODE 250: Performed by: EMERGENCY MEDICINE

## 2023-12-18 PROCEDURE — 80048 BASIC METABOLIC PNL TOTAL CA: CPT | Performed by: STUDENT IN AN ORGANIZED HEALTH CARE EDUCATION/TRAINING PROGRAM

## 2023-12-18 PROCEDURE — 25000003 PHARM REV CODE 250: Performed by: PHYSICIAN ASSISTANT

## 2023-12-18 PROCEDURE — 83036 HEMOGLOBIN GLYCOSYLATED A1C: CPT | Performed by: STUDENT IN AN ORGANIZED HEALTH CARE EDUCATION/TRAINING PROGRAM

## 2023-12-18 PROCEDURE — 83735 ASSAY OF MAGNESIUM: CPT | Performed by: STUDENT IN AN ORGANIZED HEALTH CARE EDUCATION/TRAINING PROGRAM

## 2023-12-18 PROCEDURE — 84591 ASSAY OF NOS VITAMIN: CPT | Performed by: STUDENT IN AN ORGANIZED HEALTH CARE EDUCATION/TRAINING PROGRAM

## 2023-12-18 PROCEDURE — 84100 ASSAY OF PHOSPHORUS: CPT | Performed by: STUDENT IN AN ORGANIZED HEALTH CARE EDUCATION/TRAINING PROGRAM

## 2023-12-18 PROCEDURE — 85025 COMPLETE CBC W/AUTO DIFF WBC: CPT | Performed by: STUDENT IN AN ORGANIZED HEALTH CARE EDUCATION/TRAINING PROGRAM

## 2023-12-18 PROCEDURE — 84207 ASSAY OF VITAMIN B-6: CPT | Performed by: STUDENT IN AN ORGANIZED HEALTH CARE EDUCATION/TRAINING PROGRAM

## 2023-12-18 PROCEDURE — 63600175 PHARM REV CODE 636 W HCPCS: Performed by: STUDENT IN AN ORGANIZED HEALTH CARE EDUCATION/TRAINING PROGRAM

## 2023-12-18 PROCEDURE — 36415 COLL VENOUS BLD VENIPUNCTURE: CPT | Performed by: STUDENT IN AN ORGANIZED HEALTH CARE EDUCATION/TRAINING PROGRAM

## 2023-12-18 PROCEDURE — 21400001 HC TELEMETRY ROOM

## 2023-12-18 PROCEDURE — 85652 RBC SED RATE AUTOMATED: CPT | Performed by: STUDENT IN AN ORGANIZED HEALTH CARE EDUCATION/TRAINING PROGRAM

## 2023-12-18 RX ORDER — DIPHENHYDRAMINE HCL 25 MG
25 CAPSULE ORAL EVERY 6 HOURS PRN
Status: DISCONTINUED | OUTPATIENT
Start: 2023-12-18 | End: 2023-12-19 | Stop reason: HOSPADM

## 2023-12-18 RX ADMIN — POLYETHYLENE GLYCOL 3350 17 G: 17 POWDER, FOR SOLUTION ORAL at 08:12

## 2023-12-18 RX ADMIN — Medication 6 MG: at 09:12

## 2023-12-18 RX ADMIN — HYPROMELLOSE 2910 1 DROP: 5 SOLUTION/ DROPS OPHTHALMIC at 08:12

## 2023-12-18 RX ADMIN — PIPERACILLIN SODIUM AND TAZOBACTAM SODIUM 4.5 G: 4; .5 INJECTION, POWDER, FOR SOLUTION INTRAVENOUS at 05:12

## 2023-12-18 RX ADMIN — PIPERACILLIN SODIUM AND TAZOBACTAM SODIUM 4.5 G: 4; .5 INJECTION, POWDER, FOR SOLUTION INTRAVENOUS at 04:12

## 2023-12-18 RX ADMIN — ATORVASTATIN CALCIUM 40 MG: 40 TABLET, FILM COATED ORAL at 08:12

## 2023-12-18 RX ADMIN — FUROSEMIDE 20 MG: 20 TABLET ORAL at 05:12

## 2023-12-18 RX ADMIN — OXYCODONE HYDROCHLORIDE 5 MG: 5 TABLET ORAL at 09:12

## 2023-12-18 RX ADMIN — HYPROMELLOSE 2910 1 DROP: 5 SOLUTION/ DROPS OPHTHALMIC at 09:12

## 2023-12-18 RX ADMIN — FUROSEMIDE 20 MG: 20 TABLET ORAL at 08:12

## 2023-12-18 RX ADMIN — DIPHENHYDRAMINE HYDROCHLORIDE 25 MG: 25 CAPSULE ORAL at 10:12

## 2023-12-18 RX ADMIN — ENOXAPARIN SODIUM 40 MG: 40 INJECTION SUBCUTANEOUS at 05:12

## 2023-12-18 RX ADMIN — DIPHENHYDRAMINE HYDROCHLORIDE 25 MG: 25 CAPSULE ORAL at 06:12

## 2023-12-18 RX ADMIN — AMLODIPINE BESYLATE 10 MG: 10 TABLET ORAL at 08:12

## 2023-12-18 RX ADMIN — HYPROMELLOSE 2910 1 DROP: 5 SOLUTION/ DROPS OPHTHALMIC at 05:12

## 2023-12-18 RX ADMIN — VANCOMYCIN HYDROCHLORIDE 1250 MG: 1.25 INJECTION, POWDER, LYOPHILIZED, FOR SOLUTION INTRAVENOUS at 01:12

## 2023-12-18 RX ADMIN — HYPROMELLOSE 2910 1 DROP: 5 SOLUTION/ DROPS OPHTHALMIC at 12:12

## 2023-12-18 NOTE — ASSESSMENT & PLAN NOTE
-history of chronic facial swelling, most pronounced around both eyes R>L, dating from October 2022  -CT orbits with contrast:  evidence of symmetric skin thickening and induration in the bilateral preseptal areas with no definite evidence of postseptal/retrobulbar inflammatory changes  -Evaluated by allergy service in past, workup only positive for dust mite allergy, unlikely contributing to symptoms  -Findings of facial swelling coincide with initiation of ARB last year, suspicious for angioedema  Plan:  -Reasonable to continue treatment for preseptal cellulitis given CT findings per above   -Cont IV Vancomycin, zosyn. Transition to Augmentin on discharge to complete 10 days  -Ophthalmology recommending stating artificial tears qid and warm compresses  -Discontinue ARB, monitor for any improvement  -ENT: cont IV abx  -Will pursue vitamin deficiency workup. Rheum workup ordered as well

## 2023-12-18 NOTE — PROGRESS NOTES
[facility]  Progress Note    Patient Name: Lucrecia Foster  MRN: [id number]  Patient Class: IP- Inpatient   Admission Date: 12/17/2023  Length of Stay: 1 days  Attending Physician: Robb Gore DO  Primary Care Provider: Claus John MD        Subjective:     Principal Problem:Preseptal cellulitis        HPI:  This is a 73-year-old female with a history of chronic facial rash, DM 2, HTN who presents with worsening facial rash.  Has been experiencing facial rash and swelling on and off over the last year.  Has been evaluated by Allergy Medicine, extensive allergic workup has been done only positive to dust mites, daily cetirizine was recommended.  States that she has been experiencing swelling, most pronounced around both eyes R>L since about October 2022.  Denies any use of new lotions, skin products, detergents, soaps.  States that last night it was the worst it has ever been.  Describes itching sensation all of her face with burning sensation.  Also endorses photophobia however has been experiencing this for 3 years due to dry eye.  States that she has been having new pain with ocular lateral movement, slight headache.  Denies any fevers, nausea.  She went to urgent care yesterday for which Pred forte and atropine drops were prescribed, only took 1 dose so far before coming to the ED    ED course:  On arrival vitals significant for BP as high as 209 over 93.  Lab work relatively unremarkable, blood cultures x2 were obtained.  Received doses of vancomycin, clindamycin, Zosyn.  Ophthalmology and ENT were consulted.  CT orbits with contrast showed evidence of symmetric skin thickening and induration in the bilateral preseptal areas with no definite evidence of postseptal/retrobulbar inflammatory changes    Overview/Hospital Course:  Patient presents with acute on chronic facial rash. Started on broad spectrum IV antibiotics for preseptal cellulitis that was noted on CT orbits.  Ophthalmology was consulted for possible deeper infection however stated that clinically less concern for orbital cellulitis, more likely preseptal cellulitis with possible concomitant blepharitis. Started on artificial tears and warm compresses. ENT recommended continuation of antibiotics. Due to on and off facial swelling over the last year coinciding when ARB was started, this medication has been discontinued for possible angioedema component. Vitamin deficiency workup also ordered due to hyperpigmentation and concern for deficiency.    Interval History: Seen this morning at bedside. She states that her eye lids feel gritty and stuck together with reported yellowish discharge. Facial swelling is minimally improved    Review of Systems  Objective:     Vital Signs (Most Recent):  Temp: 98.2 °F (36.8 °C) (12/18/23 1133)  Pulse: 70 (12/18/23 1133)  Resp: 18 (12/18/23 1133)  BP: (!) 140/67 (12/18/23 1133)  SpO2: 97 % (12/18/23 1133) Vital Signs (24h Range):  Temp:  [97.1 °F (36.2 °C)-98.2 °F (36.8 °C)] 98.2 °F (36.8 °C)  Pulse:  [57-74] 70  Resp:  [12-21] 18  SpO2:  [94 %-100 %] 97 %  BP: (123-209)/(66-94) 140/67     Weight: 87 kg (191 lb 11.2 oz)  Body mass index is 31.9 kg/m².    Intake/Output Summary (Last 24 hours) at 12/18/2023 1313  Last data filed at 12/17/2023 1947  Gross per 24 hour   Intake 1400 ml   Output --   Net 1400 ml         Physical Exam  Constitutional:       Appearance: Normal appearance.   HENT:      Head: Normocephalic and atraumatic.      Mouth/Throat:      Mouth: Mucous membranes are moist.   Eyes:      General:         Right eye: No discharge.         Left eye: No discharge.      Conjunctiva/sclera:      Right eye: Right conjunctiva is injected.      Left eye: Left conjunctiva is injected.   Cardiovascular:      Rate and Rhythm: Normal rate and regular rhythm.      Heart sounds: Normal heart sounds.   Pulmonary:      Effort: No respiratory distress.      Breath sounds: Normal breath sounds.    Abdominal:      General: Abdomen is flat. Bowel sounds are normal.      Palpations: Abdomen is soft.   Musculoskeletal:         General: Normal range of motion.      Cervical back: Normal range of motion and neck supple.   Skin:     General: Skin is warm and dry.      Capillary Refill: Capillary refill takes less than 2 seconds.      Findings: Rash present.      Comments: Facial hyperpigmentation and swelling most pronounced preseptal areas bilaterally and surrounding the eyes   Neurological:      General: No focal deficit present.      Mental Status: She is alert and oriented to person, place, and time.   Psychiatric:         Behavior: Behavior normal.           Significant Labs: All pertinent labs within the past 24 hours have been reviewed.    Significant Imaging: I have reviewed all pertinent imaging results/findings within the past 24 hours.     Assessment/Plan:      * Preseptal cellulitis  -history of chronic facial swelling, most pronounced around both eyes R>L, dating from October 2022  -CT orbits with contrast:  evidence of symmetric skin thickening and induration in the bilateral preseptal areas with no definite evidence of postseptal/retrobulbar inflammatory changes  -Evaluated by allergy service in past, workup only positive for dust mite allergy, unlikely contributing to symptoms  -Findings of facial swelling coincide with initiation of ARB last year, suspicious for angioedema  Plan:  -Reasonable to continue treatment for preseptal cellulitis given CT findings per above   -Cont IV Vancomycin, zosyn. Transition to Augmentin on discharge to complete 10 days  -Ophthalmology recommending stating artificial tears qid and warm compresses  -Discontinue ARB, monitor for any improvement  -ENT: cont IV abx  -Will pursue vitamin deficiency workup. Rheum workup ordered as well      Controlled type 2 diabetes mellitus without complication, without long-term current use of insulin  Patient's FSGs are controlled on  current medication regimen.  Last A1c reviewed-   Lab Results   Component Value Date    HGBA1C 6.1 (H) 06/20/2022     Most recent fingerstick glucose reviewed-   Recent Labs   Lab 12/17/23  1733 12/18/23  0732 12/18/23  1129   POCTGLUCOSE 84 128* 134*     Current correctional scale  Medium  Maintain anti-hyperglycemic dose as follows-   Antihyperglycemics (From admission, onward)      Start     Stop Route Frequency Ordered    12/17/23 1753  insulin aspart U-100 pen 0-10 Units         -- SubQ Before meals & nightly PRN 12/17/23 1701          Hold Oral hypoglycemics while patient is in the hospital.  -Home meds: Glyxambi  -SSI while in hospital    Hypertensive urgency  Patient has a current diagnosis of hypertensive urgency (without evidence of end organ damage) which is uncontrolled.  Latest blood pressure and vitals reviewed-   Temp:  [97.1 °F (36.2 °C)-98.2 °F (36.8 °C)]   Pulse:  [57-74]   Resp:  [12-21]   BP: (123-209)/(66-94)   SpO2:  [94 %-100 %] .   Patient currently on IV antihypertensives.   Home meds for hypertension were reviewed and noted below.   Hypertension Medications               amLODIPine (NORVASC) 10 MG tablet Take 10 mg by mouth.    furosemide (LASIX) 20 MG tablet Take 20 mg by mouth 2 (two) times daily.    valsartan (DIOVAN) 80 MG tablet Take 80 mg by mouth once daily.            -/93 on arrival, improved  -Cont home meds other than valsartan per above  -Hydralazine ordered prn      VTE Risk Mitigation (From admission, onward)           Ordered     enoxaparin injection 40 mg  Daily         12/17/23 1701     IP VTE HIGH RISK PATIENT  Once         12/17/23 1701     Place sequential compression device  Until discontinued         12/17/23 1622                    Discharge Planning   SHIKHA: 12/19/2023     Code Status: Full Code   Is the patient medically ready for discharge?: No    Reason for patient still in hospital (select all that apply): Patient trending condition                     Robb  DO Bao  Department of Hospital Medicine   [facility]

## 2023-12-18 NOTE — SUBJECTIVE & OBJECTIVE
Medications:  Continuous Infusions:  Scheduled Meds:   amLODIPine  10 mg Oral Daily    [START ON 12/18/2023] atorvastatin  40 mg Oral Daily    clindamycin (CLEOCIN) IVPB  600 mg Intravenous ED 1 Time    enoxparin  40 mg Subcutaneous Daily    furosemide  20 mg Oral BID    piperacillin-tazobactam (Zosyn) IV (PEDS and ADULTS) (extended infusion is not appropriate)  4.5 g Intravenous Q8H    [START ON 12/18/2023] polyethylene glycol  17 g Oral Daily    [START ON 12/18/2023] vancomycin (VANCOCIN) IV (PEDS and ADULTS)  1,250 mg Intravenous Q24H     PRN Meds:acetaminophen, albuterol-ipratropium, dextrose 10%, dextrose 10%, glucagon (human recombinant), glucose, glucose, hydrALAZINE, insulin aspart U-100, melatonin, naloxone, ondansetron, prochlorperazine, sodium chloride 0.9%, sodium chloride 0.9%, Pharmacy to dose Vancomycin consult **AND** vancomycin - pharmacy to dose     No current facility-administered medications on file prior to encounter.     Current Outpatient Medications on File Prior to Encounter   Medication Sig    albuterol (PROVENTIL) 2.5 mg /3 mL (0.083 %) nebulizer solution SMARTSIG:3 Milliliter(s) Via Nebulizer Every 8 Hours    alclomethasone (ACLOVATE) 0.05 % ointment 3 (three) times daily.    amLODIPine (NORVASC) 10 MG tablet Take 10 mg by mouth.    atorvastatin (LIPITOR) 40 MG tablet Take 40 mg by mouth once daily.    atropine 1% (ISOPTO ATROPINE) 1 % Drop Place 1 drop into both eyes 2 (two) times a day.    ciprofloxacin HCl (CILOXAN) 0.3 % ophthalmic solution     diphenhydrAMINE (BENADRYL) 25 mg capsule Take 25 mg by mouth nightly as needed for Itching.    dorzolamide-timolol 2-0.5% (COSOPT) 22.3-6.8 mg/mL ophthalmic solution INSTILL 1 DROP IN LEFT EYE TWICE A DAY    empagliflozin-linagliptin (GLYXAMBI) 10-5 mg Tab Take 1 tablet by mouth once daily.    EPINEPHrine (EPIPEN) 0.3 mg/0.3 mL AtIn Inject 0.3 mLs (0.3 mg total) into the muscle once. for 1 dose    ergocalciferol (ERGOCALCIFEROL) 50,000 unit Cap  Take 50,000 Units by mouth every 7 days.    furosemide (LASIX) 20 MG tablet Take 20 mg by mouth 2 (two) times daily.    gabapentin (NEURONTIN) 300 MG capsule Take 1 capsule (300 mg total) by mouth 3 (three) times daily. for 7 days    levocetirizine (XYZAL) 5 MG tablet Take 1 tablet by mouth every evening.    methotrexate 2.5 MG Tab SMARTSIG:3 Pill By Mouth Once a Week    prednisoLONE acetate (PRED FORTE) 1 % DrpS Place 1 drop into both eyes 4 (four) times daily. for 10 days    prednisoLONE sodium phosphate (INFLAMASE FORTE) 1 % Drop Apply 1 drop to eye 4 (four) times daily.    triamcinolone acetonide 0.1% (KENALOG) 0.1 % cream Apply topically 3 (three) times daily. for 7 days    TRUE METRIX GLUCOSE TEST STRIP Strp 2 (two) times daily.    valsartan (DIOVAN) 80 MG tablet Take 80 mg by mouth once daily.       Review of patient's allergies indicates:   Allergen Reactions    Darvocet a500 [propoxyphene n-acetaminophen] Nausea And Vomiting    Nickel Rash    Nickel sutures [surgical stainless steel]     Vicodin [hydrocodone-acetaminophen] Nausea And Vomiting    Unable to assess Rash     Pt is allergic to nickel.       Past Medical History:   Diagnosis Date    Angio-edema     Arthritis     Asthma     Cataract     Diabetes mellitus, type 2     Dry eye syndrome, bilateral     Eczema     Hyperlipidemia     Hypertension     Kidney disease     Neuropathy     Sleep apnea     Stroke      Past Surgical History:   Procedure Laterality Date    BACK SURGERY      for sciatic nerver [id number]    CATARACT EXTRACTION      HIP SURGERY      TUBAL LIGATION       Family History       Problem Relation (Age of Onset)    Cancer Mother, Father    Diabetes Father          Tobacco Use    Smoking status: Never    Smokeless tobacco: Never   Substance and Sexual Activity    Alcohol use: Never    Drug use: No    Sexual activity: Not Currently     Review of Systems  Focused ROS per HPI  Objective:     Vital Signs (Most Recent):  Temp: 98.5 °F (36.9 °C)  (12/17/23 1025)  Pulse: 74 (12/17/23 1700)  Resp: (!) 21 (12/17/23 1700)  BP: (!) 209/91 (12/17/23 1700)  SpO2: 96 % (12/17/23 1700) Vital Signs (24h Range):  Temp:  [98.5 °F (36.9 °C)] 98.5 °F (36.9 °C)  Pulse:  [55-74] 74  Resp:  [12-21] 21  SpO2:  [96 %-100 %] 96 %  BP: (140-209)/(79-93) 209/91     Weight: 88 kg (194 lb)  Body mass index is 32.28 kg/m².    Date 12/17/23 0700 - 12/18/23 0659   Shift 1985-5797 0208-5038 5001-9390 24 Hour Total   INTAKE   IV Piggyback 100 1250  1350   Shift Total(mL/kg) 100(1.1) 1250(14.2)  1350(15.3)   OUTPUT   Shift Total(mL/kg)       Weight (kg) 88 88 88 88        Physical Exam  Resting in bed comfortably   Significant bilateral periorbital edema and erythema   Erythema extending across nasal bridge   EOMI bilaterally   Conjunctival injection bilaterally   No crepitus   No overlying wounds        Significant Labs:  CBC:   Recent Labs   Lab 12/17/23  1205   WBC 11.99   RBC 5.20   HGB 14.7   HCT 45.7      MCV 88   MCH 28.3   MCHC 32.2     CMP:   Recent Labs   Lab 12/17/23  1205   *   CALCIUM 9.2   ALBUMIN 3.6   PROT 7.2      K 4.1   CO2 25      BUN 16   CREATININE 1.3   ALKPHOS 94   ALT 10   AST 20   BILITOT 0.5       Significant Diagnostics:  CT orbits 12/17/23:   Relatively symmetric skin thickening and induration subcutaneous fat planes in the bilateral preseptal periorbital region extending over the nasal bridge into the premalar soft tissues. Findings are in keeping with cellulitis.  No discrete drainable fluid collection or abscess appreciated.

## 2023-12-18 NOTE — ASSESSMENT & PLAN NOTE
72 yo F with hx of intermittent periorbital swelling over the past year presents with complaints of significant periorbital swelling with pain on movement. CT concerning for bilateral preseptal cellulitis.     - Agree with ophtho consult   - No acute ENT intervention  - Recommend admission to medicine for IV abx   - Remainder of care per ophtho/medicine

## 2023-12-18 NOTE — ASSESSMENT & PLAN NOTE
Patient has a current diagnosis of hypertensive urgency (without evidence of end organ damage) which is uncontrolled.  Latest blood pressure and vitals reviewed-   Temp:  [97.1 °F (36.2 °C)-98.2 °F (36.8 °C)]   Pulse:  [57-74]   Resp:  [12-21]   BP: (123-209)/(66-94)   SpO2:  [94 %-100 %] .   Patient currently on IV antihypertensives.   Home meds for hypertension were reviewed and noted below.   Hypertension Medications               amLODIPine (NORVASC) 10 MG tablet Take 10 mg by mouth.    furosemide (LASIX) 20 MG tablet Take 20 mg by mouth 2 (two) times daily.    valsartan (DIOVAN) 80 MG tablet Take 80 mg by mouth once daily.            -/93 on arrival, improved  -Cont home meds other than valsartan per above  -Hydralazine ordered prn

## 2023-12-18 NOTE — NURSING
Nurses Note -- 4 Eyes      12/17/2023   9:52 PM      Skin assessed during: Admit      [x] No Altered Skin Integrity Present    [x]Prevention Measures Documented      [] Yes- Altered Skin Integrity Present or Discovered   [] LDA Added if Not in Epic (Describe Wound)   [] New Altered Skin Integrity was Present on Admit and Documented in LDA   [] Wound Image Taken    Wound Care Consulted? No    Attending Nurse:  Elliot Myers RN/Staff Member:  Sravanthi

## 2023-12-18 NOTE — ASSESSMENT & PLAN NOTE
Patient's FSGs are controlled on current medication regimen.  Last A1c reviewed-   Lab Results   Component Value Date    HGBA1C 6.1 (H) 06/20/2022     Most recent fingerstick glucose reviewed-   Recent Labs   Lab 12/17/23  1733 12/18/23  0732 12/18/23  1129   POCTGLUCOSE 84 128* 134*     Current correctional scale  Medium  Maintain anti-hyperglycemic dose as follows-   Antihyperglycemics (From admission, onward)    Start     Stop Route Frequency Ordered    12/17/23 1753  insulin aspart U-100 pen 0-10 Units         -- SubQ Before meals & nightly PRN 12/17/23 1701        Hold Oral hypoglycemics while patient is in the hospital.  -Home meds: Glyxambi  -SSI while in hospital

## 2023-12-18 NOTE — SUBJECTIVE & OBJECTIVE
Interval History: Seen this morning at bedside. She states that her eye lids feel gritty and stuck together with reported yellowish discharge. Facial swelling is minimally improved    Review of Systems  Objective:     Vital Signs (Most Recent):  Temp: 98.2 °F (36.8 °C) (12/18/23 1133)  Pulse: 70 (12/18/23 1133)  Resp: 18 (12/18/23 1133)  BP: (!) 140/67 (12/18/23 1133)  SpO2: 97 % (12/18/23 1133) Vital Signs (24h Range):  Temp:  [97.1 °F (36.2 °C)-98.2 °F (36.8 °C)] 98.2 °F (36.8 °C)  Pulse:  [57-74] 70  Resp:  [12-21] 18  SpO2:  [94 %-100 %] 97 %  BP: (123-209)/(66-94) 140/67     Weight: 87 kg (191 lb 11.2 oz)  Body mass index is 31.9 kg/m².    Intake/Output Summary (Last 24 hours) at 12/18/2023 1313  Last data filed at 12/17/2023 1947  Gross per 24 hour   Intake 1400 ml   Output --   Net 1400 ml         Physical Exam  Constitutional:       Appearance: Normal appearance.   HENT:      Head: Normocephalic and atraumatic.      Mouth/Throat:      Mouth: Mucous membranes are moist.   Eyes:      General:         Right eye: No discharge.         Left eye: No discharge.      Conjunctiva/sclera:      Right eye: Right conjunctiva is injected.      Left eye: Left conjunctiva is injected.   Cardiovascular:      Rate and Rhythm: Normal rate and regular rhythm.      Heart sounds: Normal heart sounds.   Pulmonary:      Effort: No respiratory distress.      Breath sounds: Normal breath sounds.   Abdominal:      General: Abdomen is flat. Bowel sounds are normal.      Palpations: Abdomen is soft.   Musculoskeletal:         General: Normal range of motion.      Cervical back: Normal range of motion and neck supple.   Skin:     General: Skin is warm and dry.      Capillary Refill: Capillary refill takes less than 2 seconds.      Findings: Rash present.      Comments: Facial hyperpigmentation and swelling most pronounced preseptal areas bilaterally and surrounding the eyes   Neurological:      General: No focal deficit present.       Mental Status: She is alert and oriented to person, place, and time.   Psychiatric:         Behavior: Behavior normal.           Significant Labs: All pertinent labs within the past 24 hours have been reviewed.    Significant Imaging: I have reviewed all pertinent imaging results/findings within the past 24 hours.

## 2023-12-18 NOTE — HPI
"72 yo F who presents with bilateral upper and lower eyelid swelling with itchiness and burning of her eyes that started yesterday. Has had similar episodes in past that resolved on their own few days later. Today she states the swelling is worse. She saw urgent care who prescribed eye drops, did not resolve symptoms. She denies changes in vision, diplopia, photosensitivity, recent URI, nasal congestion, nasal drainage, anosmia. Denies sick contacts, fevers, chills. Does complain of pain when she "moves her eyeballs".      "

## 2023-12-18 NOTE — CONSULTS
"[facility]  Otorhinolaryngology-Head & Neck Surgery  Consult Note    Patient Name: Lucrecia Foster  MRN: [id number]  Code Status: Full Code  Admission Date: 12/17/2023  Hospital Length of Stay: 0 days  Attending Physician: Robb Gore DO  Primary Care Provider: Claus John MD    Patient information was obtained from patient and primary team.     Inpatient consult to ENT  Consult performed by: Kelsey Shaw MD  Consult ordered by: Robb Gore DO        Subjective:     Chief Complaint/Reason for Admission: Bilateral periorbital edema     History of Present Illness: 74 yo F who presents with bilateral upper and lower eyelid swelling with itchiness and burning of her eyes that started yesterday. Has had similar episodes in past that resolved on their own few days later. Today she states the swelling is worse. She saw urgent care who prescribed eye drops, did not resolve symptoms. She denies changes in vision, diplopia, photosensitivity, recent URI, nasal congestion, nasal drainage, anosmia. Denies sick contacts, fevers, chills. Does complain of pain when she "moves her eyeballs".        Medications:  Continuous Infusions:  Scheduled Meds:   amLODIPine  10 mg Oral Daily    [START ON 12/18/2023] atorvastatin  40 mg Oral Daily    clindamycin (CLEOCIN) IVPB  600 mg Intravenous ED 1 Time    enoxparin  40 mg Subcutaneous Daily    furosemide  20 mg Oral BID    piperacillin-tazobactam (Zosyn) IV (PEDS and ADULTS) (extended infusion is not appropriate)  4.5 g Intravenous Q8H    [START ON 12/18/2023] polyethylene glycol  17 g Oral Daily    [START ON 12/18/2023] vancomycin (VANCOCIN) IV (PEDS and ADULTS)  1,250 mg Intravenous Q24H     PRN Meds:acetaminophen, albuterol-ipratropium, dextrose 10%, dextrose 10%, glucagon (human recombinant), glucose, glucose, hydrALAZINE, insulin aspart U-100, melatonin, naloxone, ondansetron, prochlorperazine, sodium chloride 0.9%, sodium chloride 0.9%, Pharmacy to " dose Vancomycin consult **AND** vancomycin - pharmacy to dose     No current facility-administered medications on file prior to encounter.     Current Outpatient Medications on File Prior to Encounter   Medication Sig    albuterol (PROVENTIL) 2.5 mg /3 mL (0.083 %) nebulizer solution SMARTSIG:3 Milliliter(s) Via Nebulizer Every 8 Hours    alclomethasone (ACLOVATE) 0.05 % ointment 3 (three) times daily.    amLODIPine (NORVASC) 10 MG tablet Take 10 mg by mouth.    atorvastatin (LIPITOR) 40 MG tablet Take 40 mg by mouth once daily.    atropine 1% (ISOPTO ATROPINE) 1 % Drop Place 1 drop into both eyes 2 (two) times a day.    ciprofloxacin HCl (CILOXAN) 0.3 % ophthalmic solution     diphenhydrAMINE (BENADRYL) 25 mg capsule Take 25 mg by mouth nightly as needed for Itching.    dorzolamide-timolol 2-0.5% (COSOPT) 22.3-6.8 mg/mL ophthalmic solution INSTILL 1 DROP IN LEFT EYE TWICE A DAY    empagliflozin-linagliptin (GLYXAMBI) 10-5 mg Tab Take 1 tablet by mouth once daily.    EPINEPHrine (EPIPEN) 0.3 mg/0.3 mL AtIn Inject 0.3 mLs (0.3 mg total) into the muscle once. for 1 dose    ergocalciferol (ERGOCALCIFEROL) 50,000 unit Cap Take 50,000 Units by mouth every 7 days.    furosemide (LASIX) 20 MG tablet Take 20 mg by mouth 2 (two) times daily.    gabapentin (NEURONTIN) 300 MG capsule Take 1 capsule (300 mg total) by mouth 3 (three) times daily. for 7 days    levocetirizine (XYZAL) 5 MG tablet Take 1 tablet by mouth every evening.    methotrexate 2.5 MG Tab SMARTSIG:3 Pill By Mouth Once a Week    prednisoLONE acetate (PRED FORTE) 1 % DrpS Place 1 drop into both eyes 4 (four) times daily. for 10 days    prednisoLONE sodium phosphate (INFLAMASE FORTE) 1 % Drop Apply 1 drop to eye 4 (four) times daily.    triamcinolone acetonide 0.1% (KENALOG) 0.1 % cream Apply topically 3 (three) times daily. for 7 days    TRUE METRIX GLUCOSE TEST STRIP Strp 2 (two) times daily.    valsartan (DIOVAN) 80 MG tablet Take 80 mg by mouth once daily.        Review of patient's allergies indicates:   Allergen Reactions    Darvocet a500 [propoxyphene n-acetaminophen] Nausea And Vomiting    Nickel Rash    Nickel sutures [surgical stainless steel]     Vicodin [hydrocodone-acetaminophen] Nausea And Vomiting    Unable to assess Rash     Pt is allergic to nickel.       Past Medical History:   Diagnosis Date    Angio-edema     Arthritis     Asthma     Cataract     Diabetes mellitus, type 2     Dry eye syndrome, bilateral     Eczema     Hyperlipidemia     Hypertension     Kidney disease     Neuropathy     Sleep apnea     Stroke      Past Surgical History:   Procedure Laterality Date    BACK SURGERY      for sciatic nerver aug2017    CATARACT EXTRACTION      HIP SURGERY      TUBAL LIGATION       Family History       Problem Relation (Age of Onset)    Cancer Mother, Father    Diabetes Father          Tobacco Use    Smoking status: Never    Smokeless tobacco: Never   Substance and Sexual Activity    Alcohol use: Never    Drug use: No    Sexual activity: Not Currently     Review of Systems  Focused ROS per HPI  Objective:     Vital Signs (Most Recent):  Temp: 98.5 °F (36.9 °C) (12/17/23 1025)  Pulse: 74 (12/17/23 1700)  Resp: (!) 21 (12/17/23 1700)  BP: (!) 209/91 (12/17/23 1700)  SpO2: 96 % (12/17/23 1700) Vital Signs (24h Range):  Temp:  [98.5 °F (36.9 °C)] 98.5 °F (36.9 °C)  Pulse:  [55-74] 74  Resp:  [12-21] 21  SpO2:  [96 %-100 %] 96 %  BP: (140-209)/(79-93) 209/91     Weight: 88 kg (194 lb)  Body mass index is 32.28 kg/m².    Date 12/17/23 0700 - 12/18/23 0659   Shift 9326-5825 9439-9722 5384-0511 24 Hour Total   INTAKE   IV Piggyback 100 1250  1350   Shift Total(mL/kg) 100(1.1) 1250(14.2)  1350(15.3)   OUTPUT   Shift Total(mL/kg)       Weight (kg) 88 88 88 88        Physical Exam  Resting in bed comfortably   Significant bilateral periorbital edema and erythema   Erythema extending across nasal bridge   EOMI bilaterally   Conjunctival injection bilaterally   No  crepitus   No overlying wounds        Significant Labs:  CBC:   Recent Labs   Lab 12/17/23  1205   WBC 11.99   RBC 5.20   HGB 14.7   HCT 45.7      MCV 88   MCH 28.3   MCHC 32.2     CMP:   Recent Labs   Lab 12/17/23  1205   *   CALCIUM 9.2   ALBUMIN 3.6   PROT 7.2      K 4.1   CO2 25      BUN 16   CREATININE 1.3   ALKPHOS 94   ALT 10   AST 20   BILITOT 0.5       Significant Diagnostics:  CT orbits 12/17/23:   Relatively symmetric skin thickening and induration subcutaneous fat planes in the bilateral preseptal periorbital region extending over the nasal bridge into the premalar soft tissues. Findings are in keeping with cellulitis.  No discrete drainable fluid collection or abscess appreciated.     Assessment/Plan:     * Preseptal cellulitis  72 yo F with hx of intermittent periorbital swelling over the past year presents with complaints of significant periorbital swelling with pain on movement. CT concerning for bilateral preseptal cellulitis.     - Agree with ophtho consult   - No acute ENT intervention  - Recommend admission to medicine for IV abx   - Remainder of care per ophtho/medicine       VTE Risk Mitigation (From admission, onward)           Ordered     enoxaparin injection 40 mg  Daily         12/17/23 1701     IP VTE HIGH RISK PATIENT  Once         12/17/23 1701     Place sequential compression device  Until discontinued         12/17/23 1701     Place sequential compression device  Until discontinued         12/17/23 1622                    Thank you for your consult.     Kelsey Shaw MD  Otorhinolaryngology-Head & Neck Surgery  [facility]

## 2023-12-18 NOTE — PLAN OF CARE
[facility] - Med Surg  Initial Discharge Assessment       Primary Care Provider: Everett Jackson MD    Admission Diagnosis: Facial swelling [R22.0]  Preseptal cellulitis [L03.213]  Chest pain [R07.9]    Admission Date: 12/17/2023  Expected Discharge Date: 12/19/2023    Transition of Care Barriers: None    Payor: [employer]ARE / Plan: [employer] 65 / Product Type: Medicare Advantage /     Extended Emergency Contact Information  Primary Emergency Contact: Jaylin Foster   [place]  Mobile Phone: [phone]  Relation: Daughter  Secondary Emergency Contact: CristianGabriel   [place]  Mobile Phone: [phone]  Relation: Son    Discharge Plan A: Home, Home with family  Discharge Plan B: Home      [facility] [place] [address]. [place], [place] - [address] [facility]  [address] [place] [address]  Phone: [phone] Fax: [phone]    [id number] [place], [place] - [address]   [address] [facility] [address]  Phone: [phone] Fax: [phone]      Initial Assessment (most recent)       Adult Discharge Assessment - 12/18/23 1622          Discharge Assessment    Assessment Type Discharge Planning Assessment     Confirmed/corrected address, phone number and insurance Yes     Confirmed Demographics Correct on Facesheet     Source of Information patient     When was your last doctors appointment? --   Updated PCP to Dr. Everett Jackson.    Communicated SHIKHA with patient/caregiver Yes     Reason For Admission Preseptal cellulitis     People in Home alone     Do you expect to return to your current living situation? Yes     Do you have help at home or someone to help you manage your care at home? Yes     Who are your caregiver(s) and their phone number(s)? Pt's daughter Herrera and son Gabriel are available to provide help at home.     Current cognitive status: Alert/Oriented     Walking or Climbing Stairs  Difficulty yes     Walking or Climbing Stairs ambulation difficulty, requires equipment     Mobility Management Pt usually walks independently but sometimes uses a cane.     Dressing/Bathing Difficulty no     Readmission within 30 days? No     Patient currently being followed by outpatient case management? No     Do you currently have service(s) that help you manage your care at home? No     Do you take prescription medications? Yes     Do you have prescription coverage? Yes     Coverage [facility]     Do you have any problems affording any of your prescribed medications? No     Is the patient taking medications as prescribed? yes     Who is going to help you get home at discharge? Pt's family will provide transportation home.     How do you get to doctors appointments? family or friend will provide     Are you on dialysis? No     Do you take coumadin? No     Discharge Plan A Home;Home with family     Discharge Plan B Home     DME Needed Upon Discharge  none     Discharge Plan discussed with: Patient     Transition of Care Barriers None        Physical Activity    On average, how many days per week do you engage in moderate to strenuous exercise (like a brisk walk)? 0 days     On average, how many minutes do you engage in exercise at this level? 0 min        Financial Resource Strain    How hard is it for you to pay for the very basics like food, housing, medical care, and heating? Somewhat hard        Housing Stability    In the last 12 months, was there a time when you were not able to pay the mortgage or rent on time? No     In the last 12 months, was there a time when you did not have a steady place to sleep or slept in a shelter (including now)? No        Transportation Needs    In the past 12 months, has lack of transportation kept you from medical appointments or from getting medications? No        Food Insecurity    Within the past 12 months, you worried that your food would run out before you got the  money to buy more. Never true     Within the past 12 months, the food you bought just didn't last and you didn't have money to get more. Never true        Stress    Do you feel stress - tense, restless, nervous, or anxious, or unable to sleep at night because your mind is troubled all the time - these days? Only a little        Social Connections    In a typical week, how many times do you talk on the phone with family, friends, or neighbors? More than three times a week     How often do you get together with friends or relatives? More than three times a week     How often do you attend meetings of the clubs or organizations you belong to? More than 4 times per year     Are you , , , , never , or living with a partner?         Alcohol Use    Q1: How often do you have a drink containing alcohol? Never     Q2: How many drinks containing alcohol do you have on a typical day when you are drinking? Patient does not drink     Q3: How often do you have six or more drinks on one occasion? Never                   SW completed assessment with pt.  No hospital readmissions within last 30 days.  Pt's family will provide transportation home.      Pt lives alone in a 2 story home with 1 step for entry and 20 steps to upstairs.  Pt reported she can ambulate independently but sometimes uses a cane to help ambulate.  Pt independent with ADLs.  DME:  Bath Bench, Cane, and toilet seat.  Pt has help at home from family.      Pt does not receive coumadin, dialysis, HH.      Disp:  1.  Home. No needs.  2.  Home.     Discharge Plan A and Plan B have been determined by review of patient's clinical status, future medical and therapeutic needs, and coverage/benefits for post-acute care in coordination with multidisciplinary team members.    Janie Ayala LMSW  Part-Time-  [facility]  Ext. [phone]

## 2023-12-18 NOTE — HOSPITAL COURSE
Patient presents with acute on chronic facial rash. Started on broad spectrum IV antibiotics for preseptal cellulitis that was noted on CT orbits. Ophthalmology was consulted for possible deeper infection however stated that clinically less concern for orbital cellulitis, more likely preseptal cellulitis with possible concomitant blepharitis. Started on artificial tears and warm compresses. ENT recommended continuation of antibiotics. Due to on and off facial swelling over the last year coinciding when ARB was started, this medication has been discontinued for possible angioedema component. Swelling continued to improve throughout hospitalization. Vitamin deficiency workup also ordered due to hyperpigmentation and concern for deficiency. Workup is still mainly pending including ds-DNA (inflammatory markers were wnl)    Discharging on 10 days augmentin per ophthalmology recommendations with referral placed. Discontinued ARB, transitioned to coreg

## 2023-12-18 NOTE — ED NOTES
Telemetry Verification   Patient placed on Telemetry Box  Verified with War Room  Box # [id number]   Monitor Tech Edward   Rate 63   Rhythm NS

## 2023-12-19 VITALS
HEIGHT: 65 IN | SYSTOLIC BLOOD PRESSURE: 129 MMHG | OXYGEN SATURATION: 99 % | DIASTOLIC BLOOD PRESSURE: 61 MMHG | WEIGHT: 191.69 LBS | HEART RATE: 70 BPM | BODY MASS INDEX: 31.94 KG/M2 | RESPIRATION RATE: 18 BRPM | TEMPERATURE: 98 F

## 2023-12-19 LAB
ANA SER QL IF: NORMAL
ANION GAP SERPL CALC-SCNC: 8 MMOL/L (ref 8–16)
BASOPHILS # BLD AUTO: 0.04 K/UL (ref 0–0.2)
BASOPHILS NFR BLD: 0.4 % (ref 0–1.9)
BUN SERPL-MCNC: 21 MG/DL (ref 8–23)
CALCIUM SERPL-MCNC: 9 MG/DL (ref 8.7–10.5)
CHLORIDE SERPL-SCNC: 107 MMOL/L (ref 95–110)
CO2 SERPL-SCNC: 27 MMOL/L (ref 23–29)
CREAT SERPL-MCNC: 1.3 MG/DL (ref 0.5–1.4)
DIFFERENTIAL METHOD: ABNORMAL
DSDNA AB SER-ACNC: NORMAL [IU]/ML
EOSINOPHIL # BLD AUTO: 0.3 K/UL (ref 0–0.5)
EOSINOPHIL NFR BLD: 3 % (ref 0–8)
ERYTHROCYTE [DISTWIDTH] IN BLOOD BY AUTOMATED COUNT: 13.7 % (ref 11.5–14.5)
EST. GFR  (NO RACE VARIABLE): 42.9 ML/MIN/1.73 M^2
GLUCOSE SERPL-MCNC: 131 MG/DL (ref 70–110)
HCT VFR BLD AUTO: 46 % (ref 37–48.5)
HGB BLD-MCNC: 14.8 G/DL (ref 12–16)
IMM GRANULOCYTES # BLD AUTO: 0.1 K/UL (ref 0–0.04)
IMM GRANULOCYTES NFR BLD AUTO: 0.9 % (ref 0–0.5)
LYMPHOCYTES # BLD AUTO: 2.5 K/UL (ref 1–4.8)
LYMPHOCYTES NFR BLD: 22.3 % (ref 18–48)
MAGNESIUM SERPL-MCNC: 2.2 MG/DL (ref 1.6–2.6)
MCH RBC QN AUTO: 28.4 PG (ref 27–31)
MCHC RBC AUTO-ENTMCNC: 32.2 G/DL (ref 32–36)
MCV RBC AUTO: 88 FL (ref 82–98)
MONOCYTES # BLD AUTO: 1 K/UL (ref 0.3–1)
MONOCYTES NFR BLD: 8.8 % (ref 4–15)
NEUTROPHILS # BLD AUTO: 7.2 K/UL (ref 1.8–7.7)
NEUTROPHILS NFR BLD: 64.6 % (ref 38–73)
NRBC BLD-RTO: 0 /100 WBC
PHOSPHATE SERPL-MCNC: 4.1 MG/DL (ref 2.7–4.5)
PLATELET # BLD AUTO: 185 K/UL (ref 150–450)
PMV BLD AUTO: 12.9 FL (ref 9.2–12.9)
POCT GLUCOSE: 122 MG/DL (ref 70–110)
POTASSIUM SERPL-SCNC: 3.9 MMOL/L (ref 3.5–5.1)
RBC # BLD AUTO: 5.21 M/UL (ref 4–5.4)
SODIUM SERPL-SCNC: 142 MMOL/L (ref 136–145)
WBC # BLD AUTO: 11.21 K/UL (ref 3.9–12.7)

## 2023-12-19 PROCEDURE — 84100 ASSAY OF PHOSPHORUS: CPT | Performed by: STUDENT IN AN ORGANIZED HEALTH CARE EDUCATION/TRAINING PROGRAM

## 2023-12-19 PROCEDURE — 36415 COLL VENOUS BLD VENIPUNCTURE: CPT | Performed by: STUDENT IN AN ORGANIZED HEALTH CARE EDUCATION/TRAINING PROGRAM

## 2023-12-19 PROCEDURE — 85025 COMPLETE CBC W/AUTO DIFF WBC: CPT | Performed by: STUDENT IN AN ORGANIZED HEALTH CARE EDUCATION/TRAINING PROGRAM

## 2023-12-19 PROCEDURE — 25000003 PHARM REV CODE 250: Performed by: STUDENT IN AN ORGANIZED HEALTH CARE EDUCATION/TRAINING PROGRAM

## 2023-12-19 PROCEDURE — 80048 BASIC METABOLIC PNL TOTAL CA: CPT | Performed by: STUDENT IN AN ORGANIZED HEALTH CARE EDUCATION/TRAINING PROGRAM

## 2023-12-19 PROCEDURE — 63600175 PHARM REV CODE 636 W HCPCS: Performed by: STUDENT IN AN ORGANIZED HEALTH CARE EDUCATION/TRAINING PROGRAM

## 2023-12-19 PROCEDURE — 83735 ASSAY OF MAGNESIUM: CPT | Performed by: STUDENT IN AN ORGANIZED HEALTH CARE EDUCATION/TRAINING PROGRAM

## 2023-12-19 RX ORDER — ACETAMINOPHEN 325 MG/1
650 TABLET ORAL EVERY 6 HOURS PRN
Qty: 30 TABLET | Refills: 0 | Status: SHIPPED | OUTPATIENT
Start: 2023-12-19

## 2023-12-19 RX ORDER — CARVEDILOL 6.25 MG/1
6.25 TABLET ORAL 2 TIMES DAILY WITH MEALS
Qty: 60 TABLET | Refills: 0 | Status: SHIPPED | OUTPATIENT
Start: 2023-12-19 | End: 2024-12-18

## 2023-12-19 RX ORDER — AMOXICILLIN AND CLAVULANATE POTASSIUM 875; 125 MG/1; MG/1
1 TABLET, FILM COATED ORAL 2 TIMES DAILY
Qty: 15 TABLET | Refills: 0 | Status: SHIPPED | OUTPATIENT
Start: 2023-12-19 | End: 2023-12-27

## 2023-12-19 RX ADMIN — AMLODIPINE BESYLATE 10 MG: 10 TABLET ORAL at 08:12

## 2023-12-19 RX ADMIN — FUROSEMIDE 20 MG: 20 TABLET ORAL at 08:12

## 2023-12-19 RX ADMIN — PIPERACILLIN SODIUM AND TAZOBACTAM SODIUM 4.5 G: 4; .5 INJECTION, POWDER, FOR SOLUTION INTRAVENOUS at 01:12

## 2023-12-19 RX ADMIN — HYPROMELLOSE 2910 1 DROP: 5 SOLUTION/ DROPS OPHTHALMIC at 08:12

## 2023-12-19 RX ADMIN — PIPERACILLIN SODIUM AND TAZOBACTAM SODIUM 4.5 G: 4; .5 INJECTION, POWDER, FOR SOLUTION INTRAVENOUS at 08:12

## 2023-12-19 RX ADMIN — ATORVASTATIN CALCIUM 40 MG: 40 TABLET, FILM COATED ORAL at 08:12

## 2023-12-19 RX ADMIN — POLYETHYLENE GLYCOL 3350 17 G: 17 POWDER, FOR SOLUTION ORAL at 08:12

## 2023-12-19 NOTE — PLAN OF CARE
Problem: Adult Inpatient Plan of Care  Goal: Plan of Care Review  12/19/2023 0627 by Elliot Martinez RN  Outcome: Ongoing, Progressing  12/19/2023 0627 by Elliot Martinez RN  Outcome: Ongoing, Progressing  Goal: Patient-Specific Goal (Individualized)  12/19/2023 0627 by Elliot Martinez RN  Outcome: Ongoing, Progressing  12/19/2023 0627 by Elliot Martinez RN  Outcome: Ongoing, Progressing  Goal: Absence of Hospital-Acquired Illness or Injury  12/19/2023 0627 by Elliot Martinez RN  Outcome: Ongoing, Progressing  12/19/2023 0627 by Elliot Martinez RN  Outcome: Ongoing, Progressing  Intervention: Identify and Manage Fall Risk  Flowsheets (Taken 12/19/2023 0627)  Safety Promotion/Fall Prevention: assistive device/personal item within reach  Intervention: Prevent Skin Injury  Flowsheets (Taken 12/19/2023 0627)  Body Position: position changed independently  Skin Protection: adhesive use limited  Intervention: Prevent and Manage VTE (Venous Thromboembolism) Risk  Flowsheets (Taken 12/19/2023 0627)  Activity Management: Ambulated -L4  VTE Prevention/Management: ambulation promoted  Range of Motion: active ROM (range of motion) encouraged  Intervention: Prevent Infection  Flowsheets (Taken 12/19/2023 0627)  Infection Prevention: cohorting utilized  Goal: Optimal Comfort and Wellbeing  12/19/2023 0627 by Elliot Martinez RN  Outcome: Ongoing, Progressing  12/19/2023 0627 by Elliot Martinez RN  Outcome: Ongoing, Progressing  Intervention: Monitor Pain and Promote Comfort  Flowsheets (Taken 12/19/2023 0627)  Pain Management Interventions: quiet environment facilitated  Intervention: Provide Person-Centered Care  Flowsheets (Taken 12/19/2023 0627)  Trust Relationship/Rapport: care explained  Goal: Readiness for Transition of Care  12/19/2023 0627 by Elliot Martinez RN  Outcome: Ongoing, Progressing  12/19/2023 0627 by Elliot Martinez RN  Outcome: Ongoing, Progressing     Problem: Infection  Goal: Absence of Infection Signs and  Symptoms  12/19/2023 0627 by Elliot Martinez RN  Outcome: Ongoing, Progressing  12/19/2023 0627 by Elliot Martinez RN  Outcome: Ongoing, Progressing  Intervention: Prevent or Manage Infection  Flowsheets (Taken 12/19/2023 0627)  Fever Reduction/Comfort Measures:   lightweight bedding   lightweight clothing  Infection Management: aseptic technique maintained     Problem: Diabetes Comorbidity  Goal: Blood Glucose Level Within Targeted Range  12/19/2023 0627 by Elliot Martinez RN  Outcome: Ongoing, Progressing  12/19/2023 0627 by Elliot Martinez RN  Outcome: Ongoing, Progressing  Intervention: Monitor and Manage Glycemia  Flowsheets (Taken 12/19/2023 0627)  Glycemic Management: blood glucose monitored

## 2023-12-19 NOTE — PROGRESS NOTES
VANCOMYCIN DOSING BY PHARMACY DISCONTINUATION NOTE    Lucrecia Foster is a 73 y.o. female who had been consulted for vancomycin dosing.    The pharmacy consult for vancomycin dosing has been discontinued.     Vancomycin Dosing by Pharmacy Consult will sign-off. Please reconsult if necessary. Thank you for allowing us to participate in this patient's care.     Rosa Acosta, PharmD  Ext. [phone]

## 2023-12-19 NOTE — NURSING
Discharge instructions explained and given to patient.Intravenous IV was Removed with catheter tip intact.Patient tolerated well.

## 2023-12-19 NOTE — DISCHARGE SUMMARY
[facility] Medicine  Discharge Summary      Patient Name: Lucrecia Foster  MRN: [id number]  JASVIR: [id number]  Patient Class: IP- Inpatient  Admission Date: 12/17/2023  Hospital Length of Stay: 2 days  Discharge Date and Time:  12/19/2023 11:50 AM  Attending Physician: Robb Gore DO   Discharging Provider: Robb Gore DO  Primary Care Provider: Everett Jackson MD  Hospital Medicine Team: [facility] Robb Gore DO  Primary Care Team: [facility]    HPI:   This is a 73-year-old female with a history of chronic facial rash, DM 2, HTN who presents with worsening facial rash.  Has been experiencing facial rash and swelling on and off over the last year.  Has been evaluated by Allergy Medicine, extensive allergic workup has been done only positive to dust mites, daily cetirizine was recommended.  States that she has been experiencing swelling, most pronounced around both eyes R>L since about October 2022.  Denies any use of new lotions, skin products, detergents, soaps.  States that last night it was the worst it has ever been.  Describes itching sensation all of her face with burning sensation.  Also endorses photophobia however has been experiencing this for 3 years due to dry eye.  States that she has been having new pain with ocular lateral movement, slight headache.  Denies any fevers, nausea.  She went to urgent care yesterday for which Pred forte and atropine drops were prescribed, only took 1 dose so far before coming to the ED    ED course:  On arrival vitals significant for BP as high as 209 over 93.  Lab work relatively unremarkable, blood cultures x2 were obtained.  Received doses of vancomycin, clindamycin, Zosyn.  Ophthalmology and ENT were consulted.  CT orbits with contrast showed evidence of symmetric skin thickening and induration in the bilateral preseptal areas with no definite evidence of postseptal/retrobulbar inflammatory changes    * No surgery found *       Hospital Course:   Patient presents with acute on chronic facial rash. Started on broad spectrum IV antibiotics for preseptal cellulitis that was noted on CT orbits. Ophthalmology was consulted for possible deeper infection however stated that clinically less concern for orbital cellulitis, more likely preseptal cellulitis with possible concomitant blepharitis. Started on artificial tears and warm compresses. ENT recommended continuation of antibiotics. Due to on and off facial swelling over the last year coinciding when ARB was started, this medication has been discontinued for possible angioedema component. Swelling continued to improve throughout hospitalization. Vitamin deficiency workup also ordered due to hyperpigmentation and concern for deficiency. Workup is still mainly pending including ds-DNA (inflammatory markers were wnl)    Discharging on 10 days augmentin per ophthalmology recommendations with referral placed. Discontinued ARB, transitioned to coreg     Physical Exam  Constitutional:       Appearance: Normal appearance.   HENT:      Head: Normocephalic and atraumatic.      Mouth/Throat:      Mouth: Mucous membranes are moist.   Eyes:      General:         Right eye: No discharge.         Left eye: No discharge.      Conjunctiva/sclera:      Right eye: Right conjunctiva is injected.      Left eye: Left conjunctiva is injected.   Cardiovascular:      Rate and Rhythm: Normal rate and regular rhythm.      Heart sounds: Normal heart sounds.   Pulmonary:      Effort: No respiratory distress.      Breath sounds: Normal breath sounds.   Abdominal:      General: Abdomen is flat. Bowel sounds are normal.      Palpations: Abdomen is soft.   Musculoskeletal:         General: Normal range of motion.      Cervical back: Normal range of motion and neck supple.   Skin:     General: Skin is warm and dry.      Capillary Refill: Capillary refill takes less than 2 seconds.      Findings: Rash present.      Comments:  Facial hyperpigmentation and swelling most pronounced preseptal areas bilaterally and surrounding the eyes   Neurological:      General: No focal deficit present.      Mental Status: She is alert and oriented to person, place, and time.   Psychiatric:         Behavior: Behavior normal.  Goals of Care Treatment Preferences:  Code Status: Full Code      Consults:   Consults (From admission, onward)          Status Ordering Provider     Inpatient consult to Midline team  Once        Provider:  (Not yet assigned)    Completed TYLER CHAUDHRY     Inpatient consult to ENT  Once        Provider:  (Not yet assigned)    Completed TYLER CHAUDHRY     Inpatient consult to Ophthalmology  Once        Provider:  (Not yet assigned)    Completed TYLER CHAUDHRY            Cardiac/Vascular  Hypertensive urgency  Patient has a current diagnosis of hypertensive urgency (without evidence of end organ damage) which is uncontrolled.  Latest blood pressure and vitals reviewed-   Temp:  [97.8 °F (36.6 °C)-98.1 °F (36.7 °C)]   Pulse:  [70-87]   Resp:  [16-18]   BP: (110-151)/(53-68)   SpO2:  [93 %-99 %] .   Patient currently on IV antihypertensives.   Home meds for hypertension were reviewed and noted below.   Hypertension Medications               amLODIPine (NORVASC) 10 MG tablet Take 10 mg by mouth.    furosemide (LASIX) 20 MG tablet Take 20 mg by mouth 2 (two) times daily.    valsartan (DIOVAN) 80 MG tablet Take 80 mg by mouth once daily.            -/93 on arrival, improved  -Cont home meds other than valsartan per above  -Hydralazine ordered prn  -Start on coreg 6.25 BID on discharge now that ARB is discontinued    ID  * Preseptal cellulitis  -history of chronic facial swelling, most pronounced around both eyes R>L, dating from October 2022  -CT orbits with contrast:  evidence of symmetric skin thickening and induration in the bilateral preseptal areas with no definite evidence of postseptal/retrobulbar inflammatory  changes  -Evaluated by allergy service in past, workup only positive for dust mite allergy, unlikely contributing to symptoms  -Findings of facial swelling coincide with initiation of ARB last year, suspicious for angioedema  Plan:  -Reasonable to continue treatment for preseptal cellulitis given CT findings per above   -Cont IV Vancomycin, zosyn. Transition to Augmentin on discharge to complete 10 days  -Ophthalmology recommending stating artificial tears qid and warm compresses  -Discontinue ARB, monitor for any improvement  -ENT: cont abx  -Will pursue vitamin deficiency workup. Rheum workup ordered as well        Final Active Diagnoses:    Diagnosis Date Noted POA    PRINCIPAL PROBLEM:  Preseptal cellulitis [L03.213] 12/17/2023 Unknown    Hypertensive urgency [I16.0] 12/17/2023 Unknown    Controlled type 2 diabetes mellitus without complication, without long-term current use of insulin [E11.9] 12/17/2023 Unknown      Problems Resolved During this Admission:       Discharged Condition: fair    Disposition: Home or Self Care    Follow Up:   Follow-up Information       Jalyn Camara MD. Go on 12/22/2023.    Specialty: Family Medicine  Why: hospital follow up at 10:00 am  Contact information:  [address]  [address]  [facility] [address]  [phone]               Everett Jackson MD Follow up.    Specialty: Internal Medicine  Contact information:  [address] [facility]  [address]LO[address]  [facility] [id number] [phone]                           Patient Instructions:      Ambulatory referral/consult to Ophthalmology   Standing Status: Future   Referral Priority: Routine Referral Type: Consultation   Referral Reason: Specialty Services Required   Requested Specialty: Ophthalmology   Number of Visits Requested: 1     Notify your health care provider if you experience any of the following:     Notify your health care provider if you experience any of the following:  increased confusion or weakness     Notify your  health care provider if you experience any of the following:  persistent dizziness, light-headedness, or visual disturbances     Notify your health care provider if you experience any of the following:  worsening rash     Notify your health care provider if you experience any of the following:  severe persistent headache     Notify your health care provider if you experience any of the following:  difficulty breathing or increased cough     Notify your health care provider if you experience any of the following:  redness, tenderness, or signs of infection (pain, swelling, redness, odor or green/yellow discharge around incision site)     Notify your health care provider if you experience any of the following:  severe uncontrolled pain     Notify your health care provider if you experience any of the following:  persistent nausea and vomiting or diarrhea     Notify your health care provider if you experience any of the following:  temperature >100.4       Significant Diagnostic Studies: N/A    Pending Diagnostic Studies:       Procedure Component Value Units Date/Time    NELI [[id number]] Collected: 12/18/23 1712    Order Status: Sent Lab Status: In process Updated: 12/18/23 1721    Specimen: Blood     Narrative:      Collection has been rescheduled by [facility] at 12/18/2023 13:30 Reason:   Spoke w/ nurse chuy. Just stuck pt, wants to give pt a break. Will   come back  Collection has been rescheduled by [facility] at 12/18/2023 15:40 Reason:   Spoke w/ nurse chuy. She wants to wait for picc team to collect   blood    Anti-DNA antibody, double-stranded [[id number]] Collected: 12/18/23 1712    Order Status: Sent Lab Status: In process Updated: 12/18/23 1721    Specimen: Blood     Narrative:      Collection has been rescheduled by [facility] at 12/18/2023 13:30 Reason:   Spoke w/ nurse chuy. Just stuck pt, wants to give pt a break. Will   come back  Collection has been rescheduled by [facility] at 12/18/2023 15:40 Reason:   Spoke w/ nurse  chuy. She wants to wait for picc team to collect   blood    Niacin (vitamin B3) [[id number]] Collected: 12/18/23 1257    Order Status: Sent Lab Status: In process Updated: 12/18/23 1336    Specimen: Blood     Vitamin B1 [[id number]] Collected: 12/17/23 1717    Order Status: Sent Lab Status: In process Updated: 12/17/23 1756    Specimen: Blood     Vitamin B6 [[id number]] Collected: 12/18/23 1257    Order Status: Sent Lab Status: In process Updated: 12/18/23 1337    Specimen: Blood            Medications:  Reconciled Home Medications:      Medication List        START taking these medications      acetaminophen 325 MG tablet  Commonly known as: TYLENOL  Take 2 tablets (650 mg total) by mouth every 6 (six) hours as needed.     amoxicillin-clavulanate 875-125mg 875-125 mg per tablet  Commonly known as: AUGMENTIN  Take 1 tablet by mouth 2 (two) times daily. First dose night of 12/19/23 for 8 days     carvediloL 6.25 MG tablet  Commonly known as: COREG  Take 1 tablet (6.25 mg total) by mouth 2 (two) times daily with meals.            CONTINUE taking these medications      albuterol 2.5 mg /3 mL (0.083 %) nebulizer solution  Commonly known as: PROVENTIL  SMARTSIG:3 Milliliter(s) Via Nebulizer Every 8 Hours     alclomethasone 0.05 % ointment  Commonly known as: ACLOVATE  3 (three) times daily.     amLODIPine 10 MG tablet  Commonly known as: NORVASC  Take 10 mg by mouth.     atorvastatin 40 MG tablet  Commonly known as: LIPITOR  Take 40 mg by mouth once daily.     atropine 1% 1 % Drop  Commonly known as: ISOPTO ATROPINE  Place 1 drop into both eyes 2 (two) times a day.     diphenhydrAMINE 25 mg capsule  Commonly known as: BENADRYL  Take 25 mg by mouth nightly as needed for Itching.     dorzolamide-timolol 2-0.5% 22.3-6.8 mg/mL ophthalmic solution  Commonly known as: COSOPT  INSTILL 1 DROP IN LEFT EYE TWICE A DAY     empagliflozin-linagliptin 10-5 mg Tab  Commonly known as: GLYXAMBI  Take 1 tablet by mouth once daily.      EPINEPHrine 0.3 mg/0.3 mL Atin  Commonly known as: EPIPEN  Inject 0.3 mLs (0.3 mg total) into the muscle once. for 1 dose     ergocalciferol 50,000 unit Cap  Commonly known as: ERGOCALCIFEROL  Take 50,000 Units by mouth every 7 days.     furosemide 20 MG tablet  Commonly known as: LASIX  Take 20 mg by mouth 2 (two) times daily.     gabapentin 300 MG capsule  Commonly known as: NEURONTIN  Take 1 capsule (300 mg total) by mouth 3 (three) times daily. for 7 days     levocetirizine 5 MG tablet  Commonly known as: XYZAL  Take 1 tablet by mouth every evening.     methotrexate 2.5 MG Tab  SMARTSIG:3 Pill By Mouth Once a Week     triamcinolone acetonide 0.1% 0.1 % cream  Commonly known as: KENALOG  Apply topically 3 (three) times daily. for 7 days     TRUE METRIX GLUCOSE TEST STRIP Strp  Generic drug: blood sugar diagnostic  2 (two) times daily.            STOP taking these medications      ciprofloxacin HCl 0.3 % ophthalmic solution  Commonly known as: CILOXAN     prednisoLONE acetate 1 % Drps  Commonly known as: PRED FORTE     prednisoLONE sodium phosphate 1 % Drop  Commonly known as: INFLAMASE FORTE     valsartan 80 MG tablet  Commonly known as: DIOVAN              Indwelling Lines/Drains at time of discharge:   Lines/Drains/Airways       None                   Time spent on the discharge of patient: 40 minutes     Pt deemed appropriate for discharge. Plan discussed with pt, who was agreeable and amenable; medications were discussed and reviewed, outpatient follow-up scheduled, ER precautions were given, all questions were answered to the pt's satisfaction, and Ms Foster was subsequently discharged.      Robb Gore DO  Department of Hospital Medicine  [facility]

## 2023-12-19 NOTE — ASSESSMENT & PLAN NOTE
-history of chronic facial swelling, most pronounced around both eyes R>L, dating from October 2022  -CT orbits with contrast:  evidence of symmetric skin thickening and induration in the bilateral preseptal areas with no definite evidence of postseptal/retrobulbar inflammatory changes  -Evaluated by allergy service in past, workup only positive for dust mite allergy, unlikely contributing to symptoms  -Findings of facial swelling coincide with initiation of ARB last year, suspicious for angioedema  Plan:  -Reasonable to continue treatment for preseptal cellulitis given CT findings per above   -Cont IV Vancomycin, zosyn. Transition to Augmentin on discharge to complete 10 days  -Ophthalmology recommending stating artificial tears qid and warm compresses  -Discontinue ARB, monitor for any improvement  -ENT: cont abx  -Will pursue vitamin deficiency workup. Rheum workup ordered as well

## 2023-12-19 NOTE — ASSESSMENT & PLAN NOTE
Patient has a current diagnosis of hypertensive urgency (without evidence of end organ damage) which is uncontrolled.  Latest blood pressure and vitals reviewed-   Temp:  [97.8 °F (36.6 °C)-98.1 °F (36.7 °C)]   Pulse:  [70-87]   Resp:  [16-18]   BP: (110-151)/(53-68)   SpO2:  [93 %-99 %] .   Patient currently on IV antihypertensives.   Home meds for hypertension were reviewed and noted below.   Hypertension Medications               amLODIPine (NORVASC) 10 MG tablet Take 10 mg by mouth.    furosemide (LASIX) 20 MG tablet Take 20 mg by mouth 2 (two) times daily.    valsartan (DIOVAN) 80 MG tablet Take 80 mg by mouth once daily.            -/93 on arrival, improved  -Cont home meds other than valsartan per above  -Hydralazine ordered prn  -Start on coreg 6.25 BID on discharge now that ARB is discontinued

## 2023-12-19 NOTE — PLAN OF CARE
APPOINTMENT:    Patient Appointment(s) scheduled with MAYA LAND MD Friday Dec 22, 2023 hospital follow up at 10:00 am

## 2023-12-20 ENCOUNTER — TELEPHONE (OUTPATIENT)
Dept: OPHTHALMOLOGY | Facility: CLINIC | Age: 73
End: 2023-12-20
Payer: MEDICARE

## 2023-12-20 LAB
NIACIN SERPL-MCNC: <5 NG/ML
NICOTINAMIDE SERPL-MCNC: 11.9 NG/ML (ref 5–48)
NICOTINURATE SERPL-MCNC: <5 NG/ML

## 2023-12-20 NOTE — PLAN OF CARE
[facility] - Med Surg  Discharge Final Note    Primary Care Provider: Everett Jackson MD    Expected Discharge Date: 12/19/2023      Patient discharged to home with no needs. Follow up appointment scheduled and placed in AVS.   Discharge Plan A and Plan B have been determined by review of patient's clinical status, future medical and therapeutic needs, and coverage/benefits for post-acute care in coordination with multidisciplinary team members.  Final Discharge Note (most recent)       Final Note - 12/20/23 0901          Final Note    Assessment Type Final Discharge Note (P)      Anticipated Discharge Disposition Home or Self Care (P)      Hospital Resources/Appts/Education Provided Appointments scheduled and added to AVS (P)         Post-Acute Status    Coverage PHN (P)      Discharge Delays None known at this time (P)                      Important Message from Medicare             Contact Info       Jalyn Camara MD   Specialty: Family Medicine    [address]  [address]  [facility] [address]   Phone: [phone]       Next Steps: Go on 12/22/2023    Instructions: hospital follow up at 10:00 am    Everett Jackson MD   Specialty: Internal Medicine   Relationship: PCP - General    [facility] [facility]  [address]ND FLOOR  [facility] [address]   Phone: [phone]       Next Steps: Follow up            CONCEPCIÓN Shipley  Case Management  [phone]

## 2023-12-20 NOTE — TELEPHONE ENCOUNTER
----- Message from Ever Sidhu sent at 12/20/2023  3:38 PM CST -----  Contact: [phone]  Pt is calling to schedule an ER follow up for L03.213 (ICD-10-CM) - Preseptal cellulitis. Please call back to further assist.

## 2023-12-21 LAB — VIT B1 BLD-MCNC: 69 UG/L (ref 38–122)

## 2023-12-22 ENCOUNTER — OFFICE VISIT (OUTPATIENT)
Dept: OPHTHALMOLOGY | Facility: CLINIC | Age: 73
End: 2023-12-22
Payer: MEDICARE

## 2023-12-22 DIAGNOSIS — H11.823 CONJUNCTIVOCHALASIS, BILATERAL: ICD-10-CM

## 2023-12-22 DIAGNOSIS — H57.02 PHYSIOLOGIC ANISOCORIA: ICD-10-CM

## 2023-12-22 DIAGNOSIS — H02.205 LAGOPHTHALMOS OF BOTH LOWER EYELIDS: ICD-10-CM

## 2023-12-22 DIAGNOSIS — H04.123 DRY EYE SYNDROME OF LACRIMAL GLAND, BILATERAL: Primary | ICD-10-CM

## 2023-12-22 DIAGNOSIS — H02.202 LAGOPHTHALMOS OF BOTH LOWER EYELIDS: ICD-10-CM

## 2023-12-22 LAB
BACTERIA BLD CULT: NORMAL
BACTERIA BLD CULT: NORMAL
PYRIDOXAL SERPL-MCNC: 5 UG/L (ref 5–50)

## 2023-12-22 PROCEDURE — 92012 INTRM OPH EXAM EST PATIENT: CPT | Mod: S$GLB,,, | Performed by: OPHTHALMOLOGY

## 2023-12-22 NOTE — PROGRESS NOTES
"        Assessment /Plan     For exam results, see Encounter Report.    Dry eye syndrome of lacrimal gland, bilateral    Conjunctivochalasis, bilateral    Lagophthalmos of both lower eyelids    Physiologic anisocoria        Bilateral scaly rash of both eyelids, unlikely infectious in etiology given symmetrical involvement in both upper and lower eyelids. Follows with dermatology at outside clinic, reports hx of eczema with multiple treatments attempted.Discharge paperwork notes concern for angioedema with discontinuation of ARB.  Swelling has resolved. No orbital signs noted (no proptosis, no APD, no eom pain, normal IOP, good VA from baseline, equal red desaturation between eyes).  Given periorbital rash without orbital involvement, recommend follow up as needed PRN.     Given mild lagophthalmos and conjunctivochalasis (OD > OS), would recommend lubrication (PF AT QID, lacrilube at night). Can stop steroid drop as little improvement in symptoms noted (has been on twice a day for one week). Patient reports improvement previously on lubricating drops. Advised to stop visine.     Right eye pupil sluggish, 1mm larger on right than on left. Not recorded previously, but patient dilated on prior consult note. No APD, no vision changes, full EOM, no ptosis. Given reactivity of pupils and full EOM without ptosis, low concern for CN III involvement (though has had prior MRA brain, for evaluation of cluster headache, on 11/2022 with poor evaluation of right posterior cerebellar artery). Denies trauma history, likely physiologic given small difference between pupils.    Reports being on cosopt for three years for "glaucoma" which developed after cataract surgery. Would like to have care at [facility] (was seen by outside Ophthalmologist, Dr. Sanders and Dr. Lopez). Cup disc 0.6, normal pressures on examination. Would recommend follow up in glaucoma clinic at [facility].       I was unable to examine patient today as I was called " into an emergent surgery.     I discussed case with the resident and agree with kori/rizwana Silva

## 2023-12-25 ENCOUNTER — PATIENT MESSAGE (OUTPATIENT)
Dept: OPHTHALMOLOGY | Facility: CLINIC | Age: 73
End: 2023-12-25
Payer: MEDICARE

## 2023-12-27 ENCOUNTER — PATIENT MESSAGE (OUTPATIENT)
Dept: OPHTHALMOLOGY | Facility: CLINIC | Age: 73
End: 2023-12-27
Payer: MEDICARE

## 2024-11-19 ENCOUNTER — OFFICE VISIT (OUTPATIENT)
Dept: URGENT CARE | Facility: CLINIC | Age: 74
End: 2024-11-19
Payer: MEDICARE

## 2024-11-19 VITALS
RESPIRATION RATE: 18 BRPM | HEART RATE: 89 BPM | OXYGEN SATURATION: 98 % | WEIGHT: 194 LBS | TEMPERATURE: 99 F | SYSTOLIC BLOOD PRESSURE: 188 MMHG | DIASTOLIC BLOOD PRESSURE: 98 MMHG | HEIGHT: 65 IN | BODY MASS INDEX: 32.32 KG/M2

## 2024-11-19 DIAGNOSIS — M25.431 PAIN AND SWELLING OF RIGHT WRIST: ICD-10-CM

## 2024-11-19 DIAGNOSIS — I10 HYPERTENSION, UNSPECIFIED TYPE: ICD-10-CM

## 2024-11-19 DIAGNOSIS — M10.9 ACUTE GOUT OF RIGHT WRIST, UNSPECIFIED CAUSE: Primary | ICD-10-CM

## 2024-11-19 DIAGNOSIS — M25.531 PAIN AND SWELLING OF RIGHT WRIST: ICD-10-CM

## 2024-11-19 PROBLEM — Z86.0100 HISTORY OF COLON POLYPS: Status: ACTIVE | Noted: 2020-11-25

## 2024-11-19 PROBLEM — Z96.1 PSEUDOPHAKIA OF LEFT EYE: Status: ACTIVE | Noted: 2022-07-19

## 2024-11-19 PROBLEM — Z86.15 PERSONAL HISTORY OF LATENT TUBERCULOSIS INFECTION: Status: ACTIVE | Noted: 2019-06-29

## 2024-11-19 PROBLEM — R05.9 COUGH: Status: ACTIVE | Noted: 2023-03-07

## 2024-11-19 PROBLEM — H01.006 BLEPHARITIS OF BOTH EYES: Status: ACTIVE | Noted: 2024-03-22

## 2024-11-19 PROBLEM — R06.00 DYSPNEA: Status: ACTIVE | Noted: 2024-03-22

## 2024-11-19 PROBLEM — M33.90: Status: ACTIVE | Noted: 2022-11-30

## 2024-11-19 PROBLEM — H02.053 TRICHIASIS OF EYELID OF BOTH EYES: Status: ACTIVE | Noted: 2022-07-19

## 2024-11-19 PROBLEM — H52.4 MYOPIA OF BOTH EYES WITH ASTIGMATISM AND PRESBYOPIA: Status: ACTIVE | Noted: 2024-04-02

## 2024-11-19 PROBLEM — R26.89 BALANCE DISORDER: Status: ACTIVE | Noted: 2023-05-01

## 2024-11-19 PROBLEM — J21.9 BRONCHIOLITIS: Status: ACTIVE | Noted: 2024-11-19

## 2024-11-19 PROBLEM — Z12.31 ENCOUNTER FOR SCREENING MAMMOGRAM FOR MALIGNANT NEOPLASM OF BREAST: Status: ACTIVE | Noted: 2023-12-28

## 2024-11-19 PROBLEM — H20.12: Status: ACTIVE | Noted: 2022-07-19

## 2024-11-19 PROBLEM — E11.3213: Status: ACTIVE | Noted: 2022-08-02

## 2024-11-19 PROBLEM — L03.213 PERIORBITAL CELLULITIS: Status: ACTIVE | Noted: 2023-12-28

## 2024-11-19 PROBLEM — I25.119 ATHSCL HEART DISEASE OF NATIVE COR ART W UNSP ANG PCTRS: Status: ACTIVE | Noted: 2023-05-01

## 2024-11-19 PROBLEM — H40.009 GLAUCOMA SUSPECT: Status: ACTIVE | Noted: 2024-03-22

## 2024-11-19 PROBLEM — H16.223 KERATITIS SICCA, BOTH EYES: Status: ACTIVE | Noted: 2022-12-12

## 2024-11-19 PROBLEM — K59.00 CONSTIPATION: Status: ACTIVE | Noted: 2022-08-02

## 2024-11-19 PROBLEM — H01.00A BLEPHARITIS OF UPPER AND LOWER EYELIDS OF BOTH EYES: Status: ACTIVE | Noted: 2022-07-19

## 2024-11-19 PROBLEM — H04.123 DRY EYE SYNDROME OF BOTH EYES: Status: ACTIVE | Noted: 2022-11-30

## 2024-11-19 PROBLEM — R61 NIGHT SWEATS: Status: ACTIVE | Noted: 2023-03-07

## 2024-11-19 PROBLEM — E53.8 B12 DEFICIENCY: Status: ACTIVE | Noted: 2022-08-02

## 2024-11-19 PROBLEM — K42.9 UMBILICAL HERNIA WITHOUT OBSTRUCTION AND WITHOUT GANGRENE: Status: ACTIVE | Noted: 2023-05-01

## 2024-11-19 PROBLEM — H01.003 BLEPHARITIS OF BOTH EYES: Status: ACTIVE | Noted: 2024-03-22

## 2024-11-19 PROBLEM — E78.2 MIXED HYPERLIPIDEMIA: Status: ACTIVE | Noted: 2022-08-02

## 2024-11-19 PROBLEM — R25.2 MUSCLE CRAMPS: Status: ACTIVE | Noted: 2022-08-02

## 2024-11-19 PROBLEM — H25.811 COMBINED FORMS OF AGE-RELATED CATARACT OF RIGHT EYE: Status: ACTIVE | Noted: 2022-07-19

## 2024-11-19 PROBLEM — R91.1 PULMONARY NODULE: Status: ACTIVE | Noted: 2024-11-19

## 2024-11-19 PROBLEM — N18.31 STAGE 3A CHRONIC KIDNEY DISEASE: Status: ACTIVE | Noted: 2022-12-06

## 2024-11-19 PROBLEM — F33.0 MDD (MAJOR DEPRESSIVE DISORDER), RECURRENT EPISODE, MILD: Status: ACTIVE | Noted: 2023-05-01

## 2024-11-19 PROBLEM — R35.89 POLYURIA: Status: ACTIVE | Noted: 2024-11-18

## 2024-11-19 PROBLEM — R10.9 RIGHT SIDED ABDOMINAL PAIN: Status: ACTIVE | Noted: 2023-12-28

## 2024-11-19 PROBLEM — M75.01 ADHESIVE CAPSULITIS OF RIGHT SHOULDER: Status: ACTIVE | Noted: 2022-11-30

## 2024-11-19 PROBLEM — L98.9 SKIN LESION OF SCALP: Status: ACTIVE | Noted: 2024-11-18

## 2024-11-19 PROBLEM — T78.40XA HYPERSENSITIVITY REACTION: Status: ACTIVE | Noted: 2024-01-11

## 2024-11-19 PROBLEM — Z56.89 OTHER PROBLEMS RELATED TO EMPLOYMENT: Status: ACTIVE | Noted: 2023-12-28

## 2024-11-19 PROBLEM — H02.056 TRICHIASIS OF EYELID OF BOTH EYES: Status: ACTIVE | Noted: 2022-07-19

## 2024-11-19 PROBLEM — H52.13 MYOPIA OF BOTH EYES WITH ASTIGMATISM AND PRESBYOPIA: Status: ACTIVE | Noted: 2024-04-02

## 2024-11-19 PROBLEM — H52.203 MYOPIA OF BOTH EYES WITH ASTIGMATISM AND PRESBYOPIA: Status: ACTIVE | Noted: 2024-04-02

## 2024-11-19 PROBLEM — E11.40 TYPE 2 DIABETES MELLITUS WITH DIABETIC NEUROPATHY, WITHOUT LONG-TERM CURRENT USE OF INSULIN: Status: ACTIVE | Noted: 2024-11-03

## 2024-11-19 PROBLEM — R76.12 POSITIVE QUANTIFERON-TB GOLD TEST: Status: ACTIVE | Noted: 2024-01-11

## 2024-11-19 PROBLEM — I77.9 DISORDER OF ARTERIES AND ARTERIOLES: Status: ACTIVE | Noted: 2023-05-01

## 2024-11-19 PROBLEM — G47.33 OSA ON CPAP: Status: ACTIVE | Noted: 2022-11-02

## 2024-11-19 PROBLEM — H01.00B BLEPHARITIS OF UPPER AND LOWER EYELIDS OF BOTH EYES: Status: ACTIVE | Noted: 2022-07-19

## 2024-11-19 PROCEDURE — 99213 OFFICE O/P EST LOW 20 MIN: CPT | Mod: S$GLB,,, | Performed by: NURSE PRACTITIONER

## 2024-11-19 RX ORDER — FLUTICASONE PROPIONATE 50 MCG
1 SPRAY, SUSPENSION (ML) NASAL DAILY
COMMUNITY

## 2024-11-19 RX ORDER — MUPIROCIN CALCIUM 20 MG/G
CREAM TOPICAL 3 TIMES DAILY
COMMUNITY

## 2024-11-19 RX ORDER — NIFEDIPINE 10 MG/1
90 CAPSULE ORAL DAILY
COMMUNITY

## 2024-11-19 RX ORDER — COLCHICINE 0.6 MG/1
TABLET ORAL
Qty: 3 TABLET | Refills: 1 | Status: SHIPPED | OUTPATIENT
Start: 2024-11-19

## 2024-11-19 RX ORDER — VALSARTAN 40 MG/1
160 TABLET ORAL DAILY
COMMUNITY

## 2024-11-19 RX ORDER — DAPAGLIFLOZIN 10 MG/1
1 TABLET, FILM COATED ORAL DAILY
COMMUNITY
Start: 2024-07-11

## 2024-11-19 NOTE — PATIENT INSTRUCTIONS
Do not take aspirin to treat your gout flare.  Rest your joint. Prop it on pillows, keeping it above the level of your heart. This may help lessen pain and swelling.  Ice may help with your pain. Place an ice pack or a bag of frozen vegetables wrapped in a towel over the painful part. Never put ice right on the skin. Do not leave the ice on more than 10 to 15 minutes at a time.  Eat a healthy diet that includes plenty of fruits, vegetables, whole grain, and low-fat dairy products.  Drink plenty of water. Limit sugary drinks and alcohol as they can make your gout flare worse.  Talk with your regular doctor about other medicines or lifestyle changes that can help prevent gout flares. Keeping a healthy weight or losing weight in a healthy way may help.  Take your blood pressure medication as soon as possible as directed  Please go to the Emergency Department for any concerns or worsening of condition.  Please follow up with your primary care doctor or specialist as needed.    If you  smoke, please stop smoking.

## 2024-11-19 NOTE — PROGRESS NOTES
"Subjective:      Patient ID: Lucrecia Foster is a 74 y.o. female.    Vitals:  height is 5' 5" (1.651 m) and weight is 88 kg (194 lb). Her oral temperature is 98.5 °F (36.9 °C). Her blood pressure is 188/98 (abnormal) and her pulse is 89. Her respiration is 18 and oxygen saturation is 98%.     Chief Complaint: Hand Pain    Patient presents with c.o rt hand pain. Pain not secondary to trauma. Patient states she woke up at 4 am and her hand was swollen and numb. Otc tylenol did not help. Patient is rt hand dominant. Patient used to be on methotrexate but unsure of dx for medication. No fever.           74-year-old female presents to clinic with complaints of right wrist swelling, redness, sensitive to light touch. Lat food consumption consist of fish. Denies history of gout. Reports history of hypertension with a pcp visit yesterday nifedipine increased from 60 mg to 90 mg daily. She was prescribed Norvasc 10 mg once a day, discontinued, Coreg 6.25 mg twice a day currently not taking; too costly. Reports taking valsartan 160 mg earlier today. She is also prescribed lasix 20 mg twice a day, first dose taken this am. Need to  new prescription of nifedipine. Denies chest pain, shortness of breath, dizziness, changes in mentation or headache     Hand Pain   Her dominant hand is their right hand. Incident onset: appx 12 hours pta. The incident occurred at the gym. There was no injury mechanism. Pain location: rt hand. The quality of the pain is described as aching. Pertinent negatives include no chest pain or numbness. The symptoms are aggravated by a foreign body and movement. She has tried nothing for the symptoms.     Constitution: Negative for activity change, appetite change, chills, sweating, fatigue and fever.   Neck: Negative for neck pain.   Cardiovascular:  Negative for chest pain, leg swelling, palpitations and sob on exertion.   Respiratory:  Negative for chest tightness and shortness of breath.  "   Gastrointestinal:  Negative for abdominal pain, abdominal bloating and heartburn.   Musculoskeletal:  Positive for pain, joint pain, joint swelling, abnormal ROM of joint and muscle ache.        Right wrist   Skin:  Positive for erythema.   Neurological:  Negative for numbness and tingling.      Objective:     Physical Exam   Constitutional: She is oriented to person, place, and time. She appears well-developed. She is cooperative.  Non-toxic appearance. She does not appear ill. No distress.   HENT:   Head: Normocephalic and atraumatic.   Ears:   Right Ear: Hearing and external ear normal.   Left Ear: Hearing and external ear normal.   Nose: Nose normal. No mucosal edema, rhinorrhea or nasal deformity. No epistaxis. Right sinus exhibits no maxillary sinus tenderness and no frontal sinus tenderness. Left sinus exhibits no maxillary sinus tenderness and no frontal sinus tenderness.   Mouth/Throat: Uvula is midline, oropharynx is clear and moist and mucous membranes are normal. No trismus in the jaw. Normal dentition. No uvula swelling. No posterior oropharyngeal erythema.   Eyes: Lids are normal. Right eye exhibits no discharge. Left eye exhibits no discharge. No scleral icterus. Extraocular movement intact   Neck: Trachea normal and phonation normal. Neck supple. Carotid bruit is not present.   Cardiovascular: Normal rate, regular rhythm and normal heart sounds.   Pulmonary/Chest: Effort normal and breath sounds normal. No respiratory distress.   Abdominal: Normal appearance and bowel sounds are normal. She exhibits no distension and no mass. Soft. There is no abdominal tenderness.   Musculoskeletal:         General: No deformity.      Right wrist: She exhibits decreased range of motion, tenderness and swelling.   Neurological: She is alert and oriented to person, place, and time. She exhibits normal muscle tone. Coordination normal.   Skin: Skin is warm, dry, intact, not diaphoretic and not pale. erythema    Psychiatric: Her speech is normal and behavior is normal. Judgment and thought content normal.   Nursing note and vitals reviewed.      Assessment:     1. Acute gout of right wrist, unspecified cause    2. Pain and swelling of right wrist    3. Hypertension, unspecified type        Plan:       Acute gout of right wrist, unspecified cause  -     colchicine (COLCRYS) 0.6 mg tablet; Take 1.2 mg (2 tablets) by mouth for one dose, followed by 0.6 mg (1 tablet) after one hour x 1 dose  Dispense: 3 tablet; Refill: 1    Pain and swelling of right wrist  -     colchicine (COLCRYS) 0.6 mg tablet; Take 1.2 mg (2 tablets) by mouth for one dose, followed by 0.6 mg (1 tablet) after one hour x 1 dose  Dispense: 3 tablet; Refill: 1    Hypertension, unspecified type      The EKG shows a abnormal, regular Sinus Rhythm, at a rate of 77, there are not ST Changes. There is a previous EKG for comparison. EKG 12/17/23 Normal sinus rhythm, Possible Left atrial enlargement, T wave abnormality, consider lateral ischemia, Abnormal ECG   EKG 9/28/20 Normal sinus rhythm, Possible Left atrial enlargement, T wave abnormality, consider lateral ischemia, Abnormal ECG This EKG was interpreted by me.            Patient Instructions   Do not take aspirin to treat your gout flare.  Rest your joint. Prop it on pillows, keeping it above the level of your heart. This may help lessen pain and swelling.  Ice may help with your pain. Place an ice pack or a bag of frozen vegetables wrapped in a towel over the painful part. Never put ice right on the skin. Do not leave the ice on more than 10 to 15 minutes at a time.  Eat a healthy diet that includes plenty of fruits, vegetables, whole grain, and low-fat dairy products.  Drink plenty of water. Limit sugary drinks and alcohol as they can make your gout flare worse.  Talk with your regular doctor about other medicines or lifestyle changes that can help prevent gout flares. Keeping a healthy weight or losing  weight in a healthy way may help.  Take your blood pressure medication as soon as possible as directed  Please go to the Emergency Department for any concerns or worsening of condition.  Please follow up with your primary care doctor or specialist as needed.    If you  smoke, please stop smoking.

## 2024-11-21 LAB
OHS QRS DURATION: 76 MS
OHS QTC CALCULATION: 441 MS

## 2024-12-08 ENCOUNTER — HOSPITAL ENCOUNTER (EMERGENCY)
Facility: HOSPITAL | Age: 74
Discharge: HOME | End: 2024-12-08
Attending: EMERGENCY MEDICINE
Payer: MEDICARE

## 2024-12-08 VITALS
OXYGEN SATURATION: 99 % | HEART RATE: 89 BPM | DIASTOLIC BLOOD PRESSURE: 78 MMHG | SYSTOLIC BLOOD PRESSURE: 140 MMHG | TEMPERATURE: 99 F | WEIGHT: 194 LBS | HEIGHT: 65 IN | BODY MASS INDEX: 32.32 KG/M2 | RESPIRATION RATE: 18 BRPM

## 2024-12-08 DIAGNOSIS — L30.9 DERMATITIS: Primary | ICD-10-CM

## 2024-12-08 PROCEDURE — 99284 EMERGENCY DEPT VISIT MOD MDM: CPT

## 2024-12-08 PROCEDURE — 25000003 PHARM REV CODE 250: Performed by: EMERGENCY MEDICINE

## 2024-12-08 PROCEDURE — 63600175 PHARM REV CODE 636 W HCPCS: Performed by: EMERGENCY MEDICINE

## 2024-12-08 RX ORDER — NIFEDIPINE 60 MG/1
1 TABLET, EXTENDED RELEASE ORAL DAILY
COMMUNITY
Start: 2024-09-18 | End: 2025-09-18

## 2024-12-08 RX ORDER — DICLOFENAC SODIUM 10 MG/G
2 GEL TOPICAL 2 TIMES DAILY
COMMUNITY
Start: 2024-04-18

## 2024-12-08 RX ORDER — AMOXICILLIN AND CLAVULANATE POTASSIUM 875; 125 MG/1; MG/1
1 TABLET, FILM COATED ORAL
Status: COMPLETED | OUTPATIENT
Start: 2024-12-08 | End: 2024-12-08

## 2024-12-08 RX ORDER — AMOXICILLIN AND CLAVULANATE POTASSIUM 875; 125 MG/1; MG/1
1 TABLET, FILM COATED ORAL 2 TIMES DAILY
Qty: 14 TABLET | Refills: 0 | Status: SHIPPED | OUTPATIENT
Start: 2024-12-08

## 2024-12-08 RX ORDER — PREDNISONE 20 MG/1
40 TABLET ORAL
Status: COMPLETED | OUTPATIENT
Start: 2024-12-08 | End: 2024-12-08

## 2024-12-08 RX ORDER — PREDNISONE 20 MG/1
40 TABLET ORAL DAILY
Qty: 10 TABLET | Refills: 0 | Status: SHIPPED | OUTPATIENT
Start: 2024-12-08 | End: 2024-12-13

## 2024-12-08 RX ADMIN — PREDNISONE 40 MG: 20 TABLET ORAL at 03:12

## 2024-12-08 RX ADMIN — AMOXICILLIN AND CLAVULANATE POTASSIUM 1 TABLET: 875; 125 TABLET, FILM COATED ORAL at 03:12

## 2024-12-08 NOTE — FIRST PROVIDER EVALUATION
Emergency Department TeleTriage Encounter Note      CHIEF COMPLAINT    Chief Complaint   Patient presents with    Itching     Patient endorses itching all over her body with the face and eyes being worse. Patient states she's been here for the same issues before.        VITAL SIGNS   Initial Vitals [12/08/24 1331]   BP Pulse Resp Temp SpO2   (!) 143/70 89 16 98.7 °F (37.1 °C) 98 %      MAP       --            ALLERGIES    Review of patient's allergies indicates:   Allergen Reactions    Darvocet a500 [propoxyphene n-acetaminophen] Nausea And Vomiting    Nickel Rash    Empagliflozin-linagliptin Dermatitis    Gold salts Dermatitis    Nickel sutures [surgical stainless steel]     Vicodin [hydrocodone-acetaminophen] Nausea And Vomiting    Unable to assess Rash     Pt is allergic to nickel.       PROVIDER TRIAGE NOTE  Patient presents with itching to eyes and face. Creams previously prescribed are burning her face.       ORDERS  Labs Reviewed - No data to display    ED Orders (720h ago, onward)      None              Virtual Visit Note: The provider triage portion of this emergency department evaluation and documentation was performed via [employer], a HIPAA-compliant telemedicine application, in concert with a tele-presenter in the room. A face to face patient evaluation with one of my colleagues will occur once the patient is placed in an emergency department room.      DISCLAIMER: This note was prepared with [employer] voice recognition transcription software. Garbled syntax, mangled pronouns, and other bizarre constructions may be attributed to that software system.

## 2024-12-08 NOTE — ED NOTES
Pt has had consistent dark scaly rash to entire face, mostly around eyes. Sates has been to multiple providers and no one has diagnosed her. Her current treatments from dermatologist are not helping,. Pain is increasing, irritation to AZUL eyes.     Patient identifiers for Lucrecia Foster 74 y.o. female checked and correct.  Chief Complaint   Patient presents with    Itching     Patient endorses itching all over her body with the face and eyes being worse. Patient states she's been here for the same issues before.      Past Medical History:   Diagnosis Date    Angio-edema     Arthritis     Asthma     Cataract     Diabetes mellitus, type 2     Dry eye syndrome, bilateral     Eczema     Hyperlipidemia     Hypersensitivity reaction 1/11/2024    Hypertension     Kidney disease     Neuropathy     Sleep apnea     Stroke      Allergies reported:   Review of patient's allergies indicates:   Allergen Reactions    Darvocet a500 [propoxyphene n-acetaminophen] Nausea And Vomiting    Nickel Rash    Empagliflozin-linagliptin Dermatitis    Gold salts Dermatitis    Nickel sutures [surgical stainless steel]     Vicodin [hydrocodone-acetaminophen] Nausea And Vomiting    Unable to assess Rash     Pt is allergic to nickel.       Appearance: Pt awake, alert & oriented to person, place & time. Pt in no acute distress at present time. Pt is clean and well groomed with clothes appropriately fastened.   Skin: Skin warm, dry. Dark scaly rash around AZUL eyes and to chin. Rash is very dry, painful. Eyes are red AZUL, dry.   Musculoskeletal: Patient moving all extremities well, no obvious swelling or deformities noted.   Respiratory: Respirations spontaneous, even, and non-labored. Visible chest rise noted. Airway is open and patent. No accessory muscle use noted.   Neurologic: Sensation is intact. Speech is clear and appropriate. Eyes open spontaneously, behavior appropriate to situation, follows commands, facial expression symmetrical,  bilateral hand grasp equal and even, purposeful motor response noted.  Cardiac: All peripheral pulses present. No Bilateral lower extremity edema. Cap refill is <3 seconds.  Abdomen: Abdomen soft, non distended, non tender to palpation.   : Pt voids independently, denies dysuria, hematuria, frequency.

## 2024-12-09 NOTE — ED PROVIDER NOTES
Encounter Date: 12/8/2024       History     Chief Complaint   Patient presents with    Itching     Patient endorses itching all over her body with the face and eyes being worse. Patient states she's been here for the same issues before.      Patient has chronic recurrent dermatitis to face.  She has seen her ophtho and dermatologist.  Stopped cream and moisturizing because it burns.  No fever today.        Review of patient's allergies indicates:   Allergen Reactions    Darvocet a500 [propoxyphene n-acetaminophen] Nausea And Vomiting    Nickel Rash    Empagliflozin-linagliptin Dermatitis    Gold salts Dermatitis    Nickel sutures [surgical stainless steel]     Vicodin [hydrocodone-acetaminophen] Nausea And Vomiting    Unable to assess Rash     Pt is allergic to nickel.     Past Medical History:   Diagnosis Date    Angio-edema     Arthritis     Asthma     Cataract     Diabetes mellitus, type 2     Dry eye syndrome, bilateral     Eczema     Hyperlipidemia     Hypersensitivity reaction 1/11/2024    Hypertension     Kidney disease     Neuropathy     Sleep apnea     Stroke      Past Surgical History:   Procedure Laterality Date    BACK SURGERY      for sciatic nerver [email or web address]    CATARACT EXTRACTION      HIP SURGERY      TUBAL LIGATION       Family History   Problem Relation Name Age of Onset    Cancer Mother      Diabetes Father      Cancer Father      Allergic rhinitis Neg Hx      Allergies Neg Hx      Angioedema Neg Hx      Asthma Neg Hx      Atopy Neg Hx      Eczema Neg Hx      Immunodeficiency Neg Hx      Rhinitis Neg Hx      Urticaria Neg Hx       Social History     Tobacco Use    Smoking status: Never    Smokeless tobacco: Never   Substance Use Topics    Alcohol use: Never    Drug use: No     Review of Systems    Physical Exam     Initial Vitals [12/08/24 1331]   BP Pulse Resp Temp SpO2   (!) 143/70 89 16 98.7 °F (37.1 °C) 98 %      MAP       --         Physical Exam    Constitutional:   Non otxoc   Eyes: EOM are  normal. Pupils are equal, round, and reactive to light. Right eye exhibits no discharge. Left eye exhibits no discharge.     Skin:   Skin - irritation and swelling around face.    Reviewed her pictures from last ED vivit - much improved and no eye draiange)         ED Course   Procedures  Labs Reviewed - No data to display       Imaging Results    None          Medications   predniSONE tablet 40 mg (40 mg Oral Given 12/8/24 1519)   amoxicillin-clavulanate 875-125mg per tablet 1 tablet (1 tablet Oral Given 12/8/24 1519)     Medical Decision Making  I reviewed records.  Reviewed last ED Visit and hospitalization.  Suspect this is her recurrent dermatitis.  Doubt sepsis, abscess, retrobulbar infection, iritis  Encouraged continued use of moisture to face.  Will do oral steroid and antibiotic  F/u with her drm and ophtho    Risk  Prescription drug management.                                      Clinical Impression:  Final diagnoses:  [L30.9] Dermatitis (Primary)          ED Disposition Condition    Discharge Stable          ED Prescriptions       Medication Sig Dispense Start Date End Date Auth. Provider    amoxicillin-clavulanate 875-125mg (AUGMENTIN) 875-125 mg per tablet Take 1 tablet by mouth 2 (two) times daily. 14 tablet 12/8/2024 -- Jules Gordillo MD    predniSONE (DELTASONE) 20 MG tablet Take 2 tablets (40 mg total) by mouth once daily. for 5 days 10 tablet 12/8/2024 12/13/2024 Jules Gordillo MD          Follow-up Information       Follow up With Specialties Details Why Contact Everett Connelly MD Internal Medicine   [address] [facility]  [address] [facility] [address]  [phone]               Jules Gordillo MD  12/09/24 [id number]